# Patient Record
Sex: MALE | Race: WHITE | Employment: STUDENT | URBAN - METROPOLITAN AREA
[De-identification: names, ages, dates, MRNs, and addresses within clinical notes are randomized per-mention and may not be internally consistent; named-entity substitution may affect disease eponyms.]

---

## 2017-12-04 ENCOUNTER — OFFICE VISIT (OUTPATIENT)
Dept: FAMILY MEDICINE | Facility: OTHER | Age: 27
End: 2017-12-04
Payer: COMMERCIAL

## 2017-12-04 VITALS
SYSTOLIC BLOOD PRESSURE: 118 MMHG | HEIGHT: 74 IN | TEMPERATURE: 98.6 F | DIASTOLIC BLOOD PRESSURE: 74 MMHG | HEART RATE: 83 BPM | OXYGEN SATURATION: 97 % | RESPIRATION RATE: 18 BRPM | WEIGHT: 202 LBS | BODY MASS INDEX: 25.93 KG/M2

## 2017-12-04 DIAGNOSIS — J32.9 BACTERIAL SINUSITIS: ICD-10-CM

## 2017-12-04 DIAGNOSIS — B96.89 BACTERIAL SINUSITIS: ICD-10-CM

## 2017-12-04 DIAGNOSIS — R05.9 COUGH: Primary | ICD-10-CM

## 2017-12-04 PROCEDURE — 99213 OFFICE O/P EST LOW 20 MIN: CPT | Performed by: NURSE PRACTITIONER

## 2017-12-04 RX ORDER — AZITHROMYCIN 250 MG/1
TABLET, FILM COATED ORAL
Qty: 6 TABLET | Refills: 0 | Status: SHIPPED | OUTPATIENT
Start: 2017-12-04 | End: 2018-01-31

## 2017-12-04 RX ORDER — AZITHROMYCIN 250 MG/1
TABLET, FILM COATED ORAL
Qty: 6 TABLET | Refills: 0 | Status: SHIPPED | OUTPATIENT
Start: 2017-12-04 | End: 2017-12-04

## 2017-12-04 RX ORDER — CODEINE PHOSPHATE AND GUAIFENESIN 10; 100 MG/5ML; MG/5ML
1 SOLUTION ORAL EVERY 4 HOURS PRN
Qty: 120 ML | Refills: 0 | Status: SHIPPED | OUTPATIENT
Start: 2017-12-04 | End: 2018-01-31

## 2017-12-04 NOTE — PROGRESS NOTES
"  SUBJECTIVE:                                                    Efren Handley is a 27 year old male who presents to clinic today for the following health issues:      HPI    Acute Illness   Acute illness concerns: cough  Onset: 6 days     Fever: no     Chills/Sweats: no     Headache (location?): YES    Sinus Pressure:no    Conjunctivitis:  no    Ear Pain: no    Rhinorrhea: YES    Congestion: no     Sore Throat: YES- but that has gone away      Cough: YES-productive of green sputum (sometimes hard chunks)    Wheeze: no     Decreased Appetite: no     Nausea: no    Vomiting: no    Diarrhea:  no    Dysuria/Freq.: no    Fatigue/Achiness: YES- tired     Sick/Strep Exposure: YES- son just started feeling sick today      Therapies Tried and outcome: dayquil     Problem list and histories reviewed & adjusted, as indicated.  Additional history: as documented    BP Readings from Last 3 Encounters:   12/04/17 118/74   04/24/15 120/70   11/07/06 116/74    Wt Readings from Last 3 Encounters:   12/04/17 202 lb (91.6 kg)   04/24/15 199 lb 6.4 oz (90.4 kg)   11/07/06 162 lb (73.5 kg) (65 %)*     * Growth percentiles are based on CDC 2-20 Years data.         Labs reviewed in EPIC      ROS:  Constitutional, HEENT, cardiovascular, pulmonary, gi and gu systems are negative, except as otherwise noted.      OBJECTIVE:   /74 (BP Location: Left arm, Patient Position: Chair, Cuff Size: Adult Large)  Pulse 83  Temp 98.6  F (37  C) (Oral)  Resp 18  Ht 6' 1.82\" (1.875 m)  Wt 202 lb (91.6 kg)  SpO2 97%  BMI 26.06 kg/m2  Body mass index is 26.06 kg/(m^2).  GENERAL: alert, no distress and fatigued  EYES: Eyes grossly normal to inspection, PERRL and conjunctivae and sclerae normal  HENT: ear canals and TM's normal, nose and mouth without ulcers or lesions, nasal mucosa edematous , rhinorrhea yellow, oropharynx clear, oral mucous membranes moist and tonsillar erythema  NECK: bilateral anterior cervical adenopathy, no asymmetry, masses, " or scars and thyroid normal to palpation  RESP: lungs clear to auscultation - no rales, rhonchi or wheezes  CV: regular rate and rhythm, normal S1 S2, no S3 or S4, no murmur, click or rub, no peripheral edema and peripheral pulses strong  SKIN: no suspicious lesions or rashes  NEURO: Normal strength and tone, mentation intact and speech normal  PSYCH: mentation appears normal, affect normal/bright  LYMPH: no cervical, supraclavicular, axillary, or inguinal adenopathy    Diagnostic Test Results:  none     ASSESSMENT/PLAN:     1. Cough    - guaiFENesin-codeine (ROBITUSSIN AC) 100-10 MG/5ML SOLN solution; Take 5 mLs by mouth every 4 hours as needed for cough  Dispense: 120 mL; Refill: 0    2. Bacterial sinusitis  Will wait for 3 day if symptoms worsen or do not improve will start abx.   - azithromycin (ZITHROMAX) 250 MG tablet; Two tablets first day, then one tablet daily for four days.  Dispense: 6 tablet; Refill: 0    Patient Instructions   Please start using nasal saline spray for sinuses to help thin and remove thick mucous. If able to increase humidity. May start antibiotic if symptoms continue past 3 days (72 hours).      Please take antibiotic with a probiotic or yogurt (3 small containers) daily not directly with the antibiotic for optimal good bacterial protection.     Return to clinic is symptoms do not improve or worsen.       Thank you  Sunshine Phillips, TALI [facility]

## 2017-12-04 NOTE — MR AVS SNAPSHOT
"              After Visit Summary   12/4/2017    Efren Handley    MRN: [id number]           Patient Information     Date Of Birth          1990        Visit Information        Provider Department      12/4/2017 8:50 AM Sunshine Phillips APRN CNP [facility]        Today's Diagnoses     Cough    -  1    Bacterial sinusitis          Care Instructions    Please start using nasal saline spray for sinuses to help thin and remove thick mucous. If able to increase humidity. May start antibiotic if symptoms continue past 3 days (72 hours).      Please take antibiotic with a probiotic or yogurt (3 small containers) daily not directly with the antibiotic for optimal good bacterial protection.     Return to clinic is symptoms do not improve or worsen.       Thank you  Sunshine Phillips CNP            Follow-ups after your visit        Who to contact     If you have questions or need follow up information about today's clinic visit or your schedule please contact [facility] directly at [phone].  Normal or non-critical lab and imaging results will be communicated to you by [employer], letter or phone within 4 business days after the clinic has received the results. If you do not hear from us within 7 days, please contact the clinic through [employer] or phone. If you have a critical or abnormal lab result, we will notify you by phone as soon as possible.  Submit refill requests through [employer] or call your pharmacy and they will forward the refill request to us. Please allow 3 business days for your refill to be completed.          Additional Information About Your Visit        [employer] Information     [employer] lets you send messages to your doctor, view your test results, renew your prescriptions, schedule appointments and more. To sign up, go to [email or web address] . Click on \"Log in\" on the left side of the screen, which will take you to the Welcome page. Then click on \"Sign up Now\" " "on the right side of the page.     You will be asked to enter the access code listed below, as well as some personal information. Please follow the directions to create your username and password.     Your access code is: [email or web address]  Expires: 3/4/2018  9:34 AM     Your access code will  in 90 days. If you need help or a new code, please call your [place] clinic or [phone].        Care EveryWhere ID     This is your Care EveryWhere ID. This could be used by other organizations to access your [place] medical records  [id number]        Your Vitals Were     Pulse Temperature Respirations Height Pulse Oximetry BMI (Body Mass Index)    83 98.6  F (37  C) (Oral) 18 6' 1.82\" (1.875 m) 97% 26.06 kg/m2       Blood Pressure from Last 3 Encounters:   17 118/74   04/24/15 120/70   06 116/74    Weight from Last 3 Encounters:   17 202 lb (91.6 kg)   04/24/15 199 lb 6.4 oz (90.4 kg)   06 162 lb (73.5 kg) (65 %)*     * Growth percentiles are based on CDC 2-20 Years data.              Today, you had the following     No orders found for display         Today's Medication Changes          These changes are accurate as of: 17  9:34 AM.  If you have any questions, ask your nurse or doctor.               Start taking these medicines.        Dose/Directions    azithromycin 250 MG tablet   Commonly known as:  ZITHROMAX   Used for:  Bacterial sinusitis   Started by:  Sunshine Phillips APRN CNP        Two tablets first day, then one tablet daily for four days.   Quantity:  6 tablet   Refills:  0       guaiFENesin-codeine 100-10 MG/5ML Soln solution   Commonly known as:  ROBITUSSIN AC   Used for:  Cough   Started by:  Sunshine Phillips APRN CNP        Dose:  1 tsp.   Take 5 mLs by mouth every 4 hours as needed for cough   Quantity:  120 mL   Refills:  0            Where to get your medicines      Some of these will need a paper prescription and others can be bought over the " counter.  Ask your nurse if you have questions.     Bring a paper prescription for each of these medications     azithromycin 250 MG tablet    guaiFENesin-codeine 100-10 MG/5ML Soln solution                Primary Care Provider Office Phone # Fax #    Jesus Barksdale PA-C [phone]       [address] [address]        Equal Access to Services     [facility] Francisco[facility]VIDA : Hadii aad ku hadasho Soomaali, waaxda luqadaha, qaybta kaalmada adeegyada, waxay idiin hayaan adeeg kharash la'aan ah. So [facility] [phone].    ATENCIÓN: Si habla espkaron, tiene a white disposición servicios gratuitos de asistencia lingüística. Ivelisse al [phone].    We comply with applicable federal civil rights laws and [place] laws. We do not discriminate on the basis of race, color, national origin, age, disability, sex, sexual orientation, or gender identity.            Thank you!     Thank you for choosing [facility]  for your care. Our goal is always to provide you with excellent care. Hearing back from our patients is one way we can continue to improve our services. Please take a few minutes to complete the written survey that you may receive in the mail after your visit with us. Thank you!             Your Updated Medication List - Protect others around you: Learn how to safely use, store and throw away your medicines at www.disposemymeds.org.          This list is accurate as of: 12/4/17  9:34 AM.  Always use your most recent med list.                   Brand Name Dispense Instructions for use Diagnosis    azithromycin 250 MG tablet    ZITHROMAX    6 tablet    Two tablets first day, then one tablet daily for four days.    Bacterial sinusitis       guaiFENesin-codeine 100-10 MG/5ML Soln solution    ROBITUSSIN AC    120 mL    Take 5 mLs by mouth every 4 hours as needed for cough    Cough       NO ACTIVE MEDICATIONS     0    .

## 2017-12-04 NOTE — PATIENT INSTRUCTIONS
Please start using nasal saline spray for sinuses to help thin and remove thick mucous. If able to increase humidity. May start antibiotic if symptoms continue past 3 days (72 hours).      Please take antibiotic with a probiotic or yogurt (3 small containers) daily not directly with the antibiotic for optimal good bacterial protection.     Return to clinic is symptoms do not improve or worsen.       Thank you  Sunshine Phillips CNP

## 2017-12-04 NOTE — NURSING NOTE
"Chief Complaint   Patient presents with     Sick       Initial /74 (BP Location: Left arm, Patient Position: Chair, Cuff Size: Adult Large)  Pulse 83  Temp 98.6  F (37  C) (Oral)  Resp 18  Ht 6' 1.82\" (1.875 m)  Wt 202 lb (91.6 kg)  SpO2 97%  BMI 26.06 kg/m2 Estimated body mass index is 26.06 kg/(m^2) as calculated from the following:    Height as of this encounter: 6' 1.82\" (1.875 m).    Weight as of this encounter: 202 lb (91.6 kg).  Medication Reconciliation: complete    "

## 2018-01-31 ENCOUNTER — OFFICE VISIT (OUTPATIENT)
Dept: UROLOGY | Facility: OTHER | Age: 28
End: 2018-01-31
Payer: COMMERCIAL

## 2018-01-31 VITALS
WEIGHT: 211.5 LBS | DIASTOLIC BLOOD PRESSURE: 60 MMHG | HEIGHT: 74 IN | SYSTOLIC BLOOD PRESSURE: 120 MMHG | BODY MASS INDEX: 27.14 KG/M2

## 2018-01-31 DIAGNOSIS — Z30.09 VASECTOMY EVALUATION: Primary | ICD-10-CM

## 2018-01-31 PROCEDURE — 99202 OFFICE O/P NEW SF 15 MIN: CPT | Performed by: UROLOGY

## 2018-01-31 NOTE — NURSING NOTE
"Chief Complaint   Patient presents with     Consult     vasectomy        Initial /60 (BP Location: Right arm, Patient Position: Chair, Cuff Size: Adult Regular)  Ht 6' 1.82\" (1.875 m)  Wt 211 lb 8 oz (95.9 kg)  BMI 27.29 kg/m2 Estimated body mass index is 27.29 kg/(m^2) as calculated from the following:    Height as of this encounter: 6' 1.82\" (1.875 m).    Weight as of this encounter: 211 lb 8 oz (95.9 kg).  Medication Reconciliation: complete     Nolvia Hoffman, PRANAY       "

## 2018-01-31 NOTE — MR AVS SNAPSHOT
After Visit Summary   1/31/2018    Efren Handley    MRN: [id number]           Patient Information     Date Of Birth          1990        Visit Information        Provider Department      1/31/2018 9:15 AM Lloyd Quigley MD [facility] Instructions    Your Vasectomy is scheduled on April 5th, 2018 at 10:45am.  Please call [phone] if you need to reschedule this appointment or if you have any question.     Preparation for Vasectomy:  *Shave the hair away from the base of your penis and around your testicles.  *Wear snug underwear the day of the vasectomy to support your testicles.  *Do not take aspirin, ibuprofen, advil, motrin, aleve products one week prior to your vasectomy.  *Arrange for a  if you take any medication for relaxation from the physician.      General Vasectomy Information    Vasectomy is a surgery.  If it is successful, it will be impossible for you to ever father children.  The following information regards the male sterilization done by an operation called a vasectomy.  This is done in the physician's office.    The operation done to sterilize the male is easier, safer and much less expensive than the operation done to sterilize the woman.    Sterilization should be considered permanent.  For most males, once the operation is done, it can never me undone.  There are attempts made occasionally to reconnect the tubes that have been cut during the procedure, but this is very difficult and expensive and works only about 50-70% of the time.  In order for any of the physicians in our clinic to do a vasectomy, we require that you consider this a permanent form a sterilization.    A vasectomy can be done at any time, but it is best to think about having it done when you can take at least one day off from work and any excess activities.    Your decision to have a vasectomy should only be made with the following facts clear in mind.    1. First, a  vasectomy is only one of several means of birth control.  Many form of temporary contraception are available.  If you have any questions about other methods, please discuss this with your physician.    2. A vasectomy may be unsuccessful in approximately one out of 1000 couples per year.  This occurs when the tubes which are cut during the procedure reconnect themselves.  Sterility cannot be guaranteed.    3. You should be aware that it is the current belief of the medical profession that a vasectomy procedure does not alter a male physically, physiologically or sexually.  Because each person is a unique individual, there is always the possibility of an adverse phychiatric reaction.  This can be best avoided by being very comfortable in your own mind that you want to have this done, and that you do not want to father any children in the future.  If this is not clear in your mind, this should be further discussed with your physician.    4. You will not notice a change in the volume of your ejaculate since sperm is a very small amount of the semen and it is only the sperm that is stopped from entering the ejaculate after a vasectomy.  Your prostate and seminal vesicle glands really supply most of the semen and this is not at all decreased after a vasectomy.  Also there is no effect on the male hormones.    5. You do not become sterile immediately following a vasectomy due to the fact that there is still sperm remaining in your system that must be eliminated by ejaculation.  For this reason, your sexual partner could still become pregnant for a period of time following the vasectomy operation.  It is necessary that contraceptive measures be used until you receive confirmation from your physician that you are sterile.  It takes approximately 12 ejaculations to clear the semen of sperm, but this can differ in different men.  For this reason, it is very important that your semen be checked for sperm before you are  "considered sterile, and this should be done approximately 12 weeks after your vasectomy.      6. Vasectomy has risks and benefits.  Among the risks are possible complications resulting from the procedure.  These risks include but are not limited to:   A.  Bleeding, infection, or hematoma occuring during or in the recovery period   from the procedure.   B.  Sperm granuloma or a small pea to walnut sized lump which is a collection of   scar tissue and sperm in your scrotal sack and remains permanently   C.  There may be an increased risk of prostate cancer although the data is   unclear.            Follow-ups after your visit        Your next 10 appointments already scheduled     Apr 05, 2018 10:45 AM CDT   Return Visit with Lloyd Quigley MD   [facility]    [address] [address] [phone]              Who to contact     If you have questions or need follow up information about today's clinic visit or your schedule please contact [facility] directly at [phone].  Normal or non-critical lab and imaging results will be communicated to you by [employer], letter or phone within 4 business days after the clinic has received the results. If you do not hear from us within 7 days, please contact the clinic through [employer] or phone. If you have a critical or abnormal lab result, we will notify you by phone as soon as possible.  Submit refill requests through [employer] or call your pharmacy and they will forward the refill request to us. Please allow 3 business days for your refill to be completed.          Additional Information About Your Visit        [employer] Information     [employer] lets you send messages to your doctor, view your test results, renew your prescriptions, schedule appointments and more. To sign up, go to [email or web address] . Click on \"Log in\" on the left side of the screen, which will take you to the Welcome page. Then " "click on \"Sign up Now\" on the right side of the page.     You will be asked to enter the access code listed below, as well as some personal information. Please follow the directions to create your username and password.     Your access code is: [email or web address]  Expires: 3/4/2018  9:34 AM     Your access code will  in 90 days. If you need help or a new code, please call your [place] clinic or [phone].        Care EveryWhere ID     This is your Care EveryWhere ID. This could be used by other organizations to access your [place] medical records  [id number]        Your Vitals Were     Height BMI (Body Mass Index)                6' 1.82\" (1.875 m) 27.29 kg/m2           Blood Pressure from Last 3 Encounters:   18 120/60   17 118/74   04/24/15 120/70    Weight from Last 3 Encounters:   18 211 lb 8 oz (95.9 kg)   17 202 lb (91.6 kg)   04/24/15 199 lb 6.4 oz (90.4 kg)              Today, you had the following     No orders found for display       Primary Care Provider Office Phone # Fax #    [facility] [phone]       [address] [place] [address]        Equal Access to Services     TAMY ANDRADE AH: Hadii merline ku hadasho Soomaali, waaxda luqadaha, qaybta kaalmada adeegyada, megan mcgregor . So [facility] [phone].    ATENCIÓN: Si habla español, tiene a white disposición servicios gratuitos de asistencia lingüística. Llame al [phone].    We comply with applicable federal civil rights laws and [place] laws. We do not discriminate on the basis of race, color, national origin, age, disability, sex, sexual orientation, or gender identity.            Thank you!     Thank you for choosing [facility]  for your care. Our goal is always to provide you with excellent care. Hearing back from our patients is one way we can continue to improve our services. Please take a few minutes to complete the written survey that you " may receive in the mail after your visit with us. Thank you!             Your Updated Medication List - Protect others around you: Learn how to safely use, store and throw away your medicines at www.disposemymeds.org.          This list is accurate as of 1/31/18  9:16 AM.  Always use your most recent med list.                   Brand Name Dispense Instructions for use Diagnosis    NO ACTIVE MEDICATIONS     0    .

## 2018-01-31 NOTE — PATIENT INSTRUCTIONS
Your Vasectomy is scheduled on April 5th, 2018 at 10:45am.  Please call [phone] if you need to reschedule this appointment or if you have any question.     Preparation for Vasectomy:  *Shave the hair away from the base of your penis and around your testicles.  *Wear snug underwear the day of the vasectomy to support your testicles.  *Do not take aspirin, ibuprofen, advil, motrin, aleve products one week prior to your vasectomy.  *Arrange for a  if you take any medication for relaxation from the physician.      General Vasectomy Information    Vasectomy is a surgery.  If it is successful, it will be impossible for you to ever father children.  The following information regards the male sterilization done by an operation called a vasectomy.  This is done in the physician's office.    The operation done to sterilize the male is easier, safer and much less expensive than the operation done to sterilize the woman.    Sterilization should be considered permanent.  For most males, once the operation is done, it can never me undone.  There are attempts made occasionally to reconnect the tubes that have been cut during the procedure, but this is very difficult and expensive and works only about 50-70% of the time.  In order for any of the physicians in our clinic to do a vasectomy, we require that you consider this a permanent form a sterilization.    A vasectomy can be done at any time, but it is best to think about having it done when you can take at least one day off from work and any excess activities.    Your decision to have a vasectomy should only be made with the following facts clear in mind.    1. First, a vasectomy is only one of several means of birth control.  Many form of temporary contraception are available.  If you have any questions about other methods, please discuss this with your physician.    2. A vasectomy may be unsuccessful in approximately one out of 1000 couples per year.  This occurs  when the tubes which are cut during the procedure reconnect themselves.  Sterility cannot be guaranteed.    3. You should be aware that it is the current belief of the medical profession that a vasectomy procedure does not alter a male physically, physiologically or sexually.  Because each person is a unique individual, there is always the possibility of an adverse phychiatric reaction.  This can be best avoided by being very comfortable in your own mind that you want to have this done, and that you do not want to father any children in the future.  If this is not clear in your mind, this should be further discussed with your physician.    4. You will not notice a change in the volume of your ejaculate since sperm is a very small amount of the semen and it is only the sperm that is stopped from entering the ejaculate after a vasectomy.  Your prostate and seminal vesicle glands really supply most of the semen and this is not at all decreased after a vasectomy.  Also there is no effect on the male hormones.    5. You do not become sterile immediately following a vasectomy due to the fact that there is still sperm remaining in your system that must be eliminated by ejaculation.  For this reason, your sexual partner could still become pregnant for a period of time following the vasectomy operation.  It is necessary that contraceptive measures be used until you receive confirmation from your physician that you are sterile.  It takes approximately 12 ejaculations to clear the semen of sperm, but this can differ in different men.  For this reason, it is very important that your semen be checked for sperm before you are considered sterile, and this should be done approximately 12 weeks after your vasectomy.      6. Vasectomy has risks and benefits.  Among the risks are possible complications resulting from the procedure.  These risks include but are not limited to:   A.  Bleeding, infection, or hematoma occuring during or  in the recovery period   from the procedure.   B.  Sperm granuloma or a small pea to walnut sized lump which is a collection of   scar tissue and sperm in your scrotal sack and remains permanently   C.  There may be an increased risk of prostate cancer although the data is   unclear.

## 2018-02-12 NOTE — PROGRESS NOTES
"  SUBJECTIVE:   Efren Handley is a 28 year old male who presents to clinic today for the following health issues:      HPI     Concern - Collar bone pain   Onset: 1 month    Description:   Bench pressing at the gym and felt a pop. Pain ever since. Getting a bit better.ROM decreased     Intensity: moderate    Progression of Symptoms:  Improving slightly     Precipitating factors:   Worsened by: lifting arm    Therapies Tried and outcome: arm    Problem list and histories reviewed & adjusted, as indicated.  Additional history: as documented    Patient Active Problem List   Diagnosis     CARDIOVASCULAR SCREENING; LDL GOAL LESS THAN 160     Past Surgical History:   Procedure Laterality Date     NO HISTORY OF SURGERY         Social History   Substance Use Topics     Smoking status: Former Smoker     Smokeless tobacco: Never Used     Alcohol use Yes      Comment: occ     History reviewed. No pertinent family history.      Current Outpatient Prescriptions   Medication Sig Dispense Refill     NO ACTIVE MEDICATIONS . 0 0     No lab results found.   BP Readings from Last 3 Encounters:   02/14/18 118/70   01/31/18 120/60   12/04/17 118/74    Wt Readings from Last 3 Encounters:   02/14/18 209 lb (94.8 kg)   01/31/18 211 lb 8 oz (95.9 kg)   12/04/17 202 lb (91.6 kg)            Labs reviewed in EPIC    ROS:  Constitutional, HEENT, cardiovascular, pulmonary, gi and gu systems are negative, except as otherwise noted.    OBJECTIVE:     /70 (BP Location: Right arm, Patient Position: Chair, Cuff Size: Adult Regular)  Pulse 68  Temp 98.1  F (36.7  C) (Oral)  Resp 16  Ht 6' 1.82\" (1.875 m)  Wt 209 lb (94.8 kg)  BMI 26.97 kg/m2  Body mass index is 26.97 kg/(m^2).  GENERAL: healthy, alert and no distress  NECK: no adenopathy, no asymmetry, masses, or scars and thyroid normal to palpation  RESP: lungs clear to auscultation - no rales, rhonchi or wheezes  CV: regular rate and rhythm, normal S1 S2, no S3 or S4, no murmur, click " or rub, no peripheral edema and peripheral pulses strong  MS:   SHOULDER Exam-Right   Inspection: swelling- no , redness- no , bruising- no , asymmetry- YES, glenohumeral joint anterior bulge- no , distal clavicle elevation- YES, muscle atrophy- none and scapular winging- no       Range of Motion: Active-pain with abduction of arm across body and behind body and with reaching up.     Strength: Good strength 5/5   Special tests: Positive painful arc- negative        SKIN: no suspicious lesions or rashes        ASSESSMENT/PLAN:     1. Pain of right clavicle  Questionable AC joint x-ray obtained and negative for normality.  Will treat for strain handout given and reviewed with patient    2. Need for prophylactic vaccination and inoculation against influenza  Declined    Home care instructions were reviewed with the patient. The risks, benefits and treatment options of prescribed medications or other treatments have been discussed with the patient. The patient verbalized their understanding and should call or follow up if no improvement or if they develop further problems.  Return to clinic prn    Patient Instructions         AC Joint Sprain (Adult)    The AC or acromioclavicular joint is at the end of the collar bone, or clavicle, near the shoulder. The AC joint is made of 4 ligaments that hold the collar bone to the shoulder blade, or scapula. With an AC joint sprain, these ligaments may be partly or fully torn. In both cases this causes pain and swelling at the end of the collar bone. If the ligaments are completely torn, the collar bone will rise up.  AC joint sprains are given a grade depending on whether they are mild, moderate, or severe:    Grade 1. A mild sprain, with minor damage to the ligaments. The collar bone stays in place.    Grade 2. A moderate sprain. The ligaments are partly torn. The collar bone is moved out of place. The injured shoulder may look lower and flatter than the other shoulder.    Grade  3. The most severe kind of sprain. The ligaments are completely torn. The collar bone is no longer joined to the shoulder blade. The collar bone rises up. This creates a bump on top of the shoulder. The ligaments heal in this position, so the bump does not go away. It is possible to have surgery to correct the bump. But normal shoulder function will return even without surgery.  An AC sprain will take up to 6 weeks to heal, depending on how severe it is. It is often treated with a sling. Or a sling and an elastic wrap around the chest may be used. Physical therapy may be needed to help the shoulder keep full range of motion. Once healed, you can expect full recovery of shoulder function.  Home care    Your provider may prescribe medicines for pain. Or you may use over-the-counter pain medicines. Talk with your provider beforetaking medicines if you have chronic liver or kidney disease, or have ever had a stomach ulcer or GI (gastrointestinal) bleeding.    Make an appointment right away to see your provider or an orthopedic or bone doctor, for further evaluation and treatment of the injury.    Use the injured area as little as possible. This will help decrease pain and swelling and allow the area to heal.    Place an ice pack over the injured area for 15 minutes. Do this every 4 to 6 hours for the first 24 to 48 hours, or as directed. Keep using ice packs to ease pain and swelling as directed by your provider or the orthopedic doctor.    To make an ice pack, put ice cubes in a plastic bag that seals at the top. Wrap the bag in a clean, thin towel or cloth. Never put ice or an ice pack directly on the skin. The ice pack can be put right on the wrap or sling. As the ice melts, be careful to not get the wrap or sling wet.    A sling alone is often enough. Sometime a sling and a wrap around the chest may be used to help ease pain, if needed. This also helps keep your injured arm from moving. Use these devices as advised  until you are seen by your healthcare provider or the orthopedic doctor. Always ask when the wrap should be worn. Always ask when the wrap can be removed.    Wear the sling while awake or as advised, until you are scheduled to see your healthcare provider or an orthopedic doctor. You may remove the sling to sleep. You may remove the sling to bathe. Make sure the sling is comfortable and keeps your arm raised, as advised by the provider. Follow the provider s instructions on how to use the sling. Always ask when you should wear the sling. Always ask when the sling can be removed.    Shoulder joints become stiff if they are kept still for too long. Talk with your healthcare provider or the orthopedic doctor about range of motion exercises and possible physical therapy.  Follow-up care  Follow up with your healthcare provider, or as advised. Your provider may refer you to a specialist such as an orthopedic, or bone, doctor.  If X-rays were taken, you will be told of any new findings that may affect your care.  When to seek medical advice  Call your healthcare provider right away if any of these occur:    Your shoulder looks off-balance    You have increased pain, swelling, or bruising    You have increased pain even after using prescribed pain medicine    You have continuing pain     Your hand or arm becomes cold, blue, numb, or tingly    You have trouble moving your shoulder, wrist, or elbow due to stiffness  Date Last Reviewed: 10/8/2015    5088-0168 [employer]. [address] [place] [address]. All rights reserved. This information is not intended as a substitute for professional medical care. Always follow your healthcare professional's instructions.        Please rest and ice shoulder. Do gentle range of motion (pendulum) exercises.  Avoid moving arm laterally across the body extending excessively overhead or placing the arm behind back.  I will call you with your xray results and any  additional treatment as indicated.     Thank you  Sunshine Phillips [facility]

## 2018-02-14 ENCOUNTER — OFFICE VISIT (OUTPATIENT)
Dept: FAMILY MEDICINE | Facility: OTHER | Age: 28
End: 2018-02-14
Payer: COMMERCIAL

## 2018-02-14 ENCOUNTER — RADIANT APPOINTMENT (OUTPATIENT)
Dept: GENERAL RADIOLOGY | Facility: OTHER | Age: 28
End: 2018-02-14
Attending: NURSE PRACTITIONER
Payer: COMMERCIAL

## 2018-02-14 VITALS
BODY MASS INDEX: 26.82 KG/M2 | RESPIRATION RATE: 16 BRPM | TEMPERATURE: 98.1 F | WEIGHT: 209 LBS | HEART RATE: 68 BPM | DIASTOLIC BLOOD PRESSURE: 70 MMHG | HEIGHT: 74 IN | SYSTOLIC BLOOD PRESSURE: 118 MMHG

## 2018-02-14 DIAGNOSIS — M89.8X1 PAIN OF RIGHT CLAVICLE: Primary | ICD-10-CM

## 2018-02-14 DIAGNOSIS — M89.8X1 PAIN OF RIGHT CLAVICLE: ICD-10-CM

## 2018-02-14 DIAGNOSIS — Z23 NEED FOR PROPHYLACTIC VACCINATION AND INOCULATION AGAINST INFLUENZA: ICD-10-CM

## 2018-02-14 PROCEDURE — 99213 OFFICE O/P EST LOW 20 MIN: CPT | Performed by: NURSE PRACTITIONER

## 2018-02-14 PROCEDURE — 73030 X-RAY EXAM OF SHOULDER: CPT | Mod: RT

## 2018-02-14 NOTE — NURSING NOTE
"Chief Complaint   Patient presents with     Pain     collar bone pain     Panel Management     donald, jose roberto, flu       Initial /70 (BP Location: Right arm, Patient Position: Chair, Cuff Size: Adult Regular)  Pulse 68  Temp 98.1  F (36.7  C) (Oral)  Resp 16  Ht 6' 1.82\" (1.875 m)  Wt 209 lb (94.8 kg)  BMI 26.97 kg/m2 Estimated body mass index is 26.97 kg/(m^2) as calculated from the following:    Height as of this encounter: 6' 1.82\" (1.875 m).    Weight as of this encounter: 209 lb (94.8 kg).  Medication Reconciliation: complete    "

## 2018-02-14 NOTE — MR AVS SNAPSHOT
After Visit Summary   2/14/2018    Efren Handley    MRN: [id number]           Patient Information     Date Of Birth          1990        Visit Information        Provider Department      2/14/2018 2:40 PM Sunshine Phillips APRN [facility]        Today's Diagnoses     Need for prophylactic vaccination and inoculation against influenza    -  1    Pain of right clavicle          Care Instructions          AC Joint Sprain (Adult)    The AC or acromioclavicular joint is at the end of the collar bone, or clavicle, near the shoulder. The AC joint is made of 4 ligaments that hold the collar bone to the shoulder blade, or scapula. With an AC joint sprain, these ligaments may be partly or fully torn. In both cases this causes pain and swelling at the end of the collar bone. If the ligaments are completely torn, the collar bone will rise up.  AC joint sprains are given a grade depending on whether they are mild, moderate, or severe:    Grade 1. A mild sprain, with minor damage to the ligaments. The collar bone stays in place.    Grade 2. A moderate sprain. The ligaments are partly torn. The collar bone is moved out of place. The injured shoulder may look lower and flatter than the other shoulder.    Grade 3. The most severe kind of sprain. The ligaments are completely torn. The collar bone is no longer joined to the shoulder blade. The collar bone rises up. This creates a bump on top of the shoulder. The ligaments heal in this position, so the bump does not go away. It is possible to have surgery to correct the bump. But normal shoulder function will return even without surgery.  An AC sprain will take up to 6 weeks to heal, depending on how severe it is. It is often treated with a sling. Or a sling and an elastic wrap around the chest may be used. Physical therapy may be needed to help the shoulder keep full range of motion. Once healed, you can expect full recovery of shoulder  function.  Home care    Your provider may prescribe medicines for pain. Or you may use over-the-counter pain medicines. Talk with your provider beforetaking medicines if you have chronic liver or kidney disease, or have ever had a stomach ulcer or GI (gastrointestinal) bleeding.    Make an appointment right away to see your provider or an orthopedic or bone doctor, for further evaluation and treatment of the injury.    Use the injured area as little as possible. This will help decrease pain and swelling and allow the area to heal.    Place an ice pack over the injured area for 15 minutes. Do this every 4 to 6 hours for the first 24 to 48 hours, or as directed. Keep using ice packs to ease pain and swelling as directed by your provider or the orthopedic doctor.    To make an ice pack, put ice cubes in a plastic bag that seals at the top. Wrap the bag in a clean, thin towel or cloth. Never put ice or an ice pack directly on the skin. The ice pack can be put right on the wrap or sling. As the ice melts, be careful to not get the wrap or sling wet.    A sling alone is often enough. Sometime a sling and a wrap around the chest may be used to help ease pain, if needed. This also helps keep your injured arm from moving. Use these devices as advised until you are seen by your healthcare provider or the orthopedic doctor. Always ask when the wrap should be worn. Always ask when the wrap can be removed.    Wear the sling while awake or as advised, until you are scheduled to see your healthcare provider or an orthopedic doctor. You may remove the sling to sleep. You may remove the sling to bathe. Make sure the sling is comfortable and keeps your arm raised, as advised by the provider. Follow the provider s instructions on how to use the sling. Always ask when you should wear the sling. Always ask when the sling can be removed.    Shoulder joints become stiff if they are kept still for too long. Talk with your healthcare  provider or the orthopedic doctor about range of motion exercises and possible physical therapy.  Follow-up care  Follow up with your healthcare provider, or as advised. Your provider may refer you to a specialist such as an orthopedic, or bone, doctor.  If X-rays were taken, you will be told of any new findings that may affect your care.  When to seek medical advice  Call your healthcare provider right away if any of these occur:    Your shoulder looks off-balance    You have increased pain, swelling, or bruising    You have increased pain even after using prescribed pain medicine    You have continuing pain     Your hand or arm becomes cold, blue, numb, or tingly    You have trouble moving your shoulder, wrist, or elbow due to stiffness  Date Last Reviewed: 10/8/2015    3312-3485 [employer]. [address] [place] [address]. All rights reserved. This information is not intended as a substitute for professional medical care. Always follow your healthcare professional's instructions.        Please rest and ice shoulder. Do gentle range of motion (pendulum) exercises.  Avoid moving arm laterally across the body extending excessively overhead or placing the arm behind back.  I will call you with your xray results and any additional treatment as indicated.     Thank you  Sunshine Phillips CNP                  Follow-ups after your visit        Your next 10 appointments already scheduled     Apr 05, 2018 10:45 AM CDT   Return Visit with Lloyd Quigley MD   [facility]    [address] [facility] [address] [phone]              Who to contact     If you have questions or need follow up information about today's clinic visit or your schedule please contact [facility] directly at [phone].  Normal or non-critical lab and imaging results will be communicated to you by MyChart, letter or phone within 4 business days after the  "clinic has received the results. If you do not hear from us within 7 days, please contact the clinic through [employer] or phone. If you have a critical or abnormal lab result, we will notify you by phone as soon as possible.  Submit refill requests through [employer] or call your pharmacy and they will forward the refill request to us. Please allow 3 business days for your refill to be completed.          Additional Information About Your Visit        [employer] Information     [employer] lets you send messages to your doctor, view your test results, renew your prescriptions, schedule appointments and more. To sign up, go to [email or web address] . Click on \"Log in\" on the left side of the screen, which will take you to the Welcome page. Then click on \"Sign up Now\" on the right side of the page.     You will be asked to enter the access code listed below, as well as some personal information. Please follow the directions to create your username and password.     Your access code is: [email or web address]  Expires: 3/4/2018  9:34 AM     Your access code will  in 90 days. If you need help or a new code, please call your [facility] clinic or [phone].        Care EveryWhere ID     This is your Care EveryWhere ID. This could be used by other organizations to access your [facility] medical records  [id number]        Your Vitals Were     Pulse Temperature Respirations Height BMI (Body Mass Index)       68 98.1  F (36.7  C) (Oral) 16 6' 1.82\" (1.875 m) 26.97 kg/m2        Blood Pressure from Last 3 Encounters:   18 118/70   18 120/60   17 118/74    Weight from Last 3 Encounters:   18 209 lb (94.8 kg)   18 211 lb 8 oz (95.9 kg)   17 202 lb (91.6 kg)              Today, you had the following     No orders found for display       Primary Care Provider Office Phone # Fax #    [facility] [phone]       [address] [place] [address]        Equal Access to Services "     TAMY ANDRADE : Hadii aad marcela javan Ogden, waaxda luqadaha, qaybta kaalmada kobecarrietomi, megan buckneralexyeison mcgregor . So [facility] [phone].    ATENCIÓN: Si habla español, tiene a white disposición servicios gratuitos de asistencia lingüística. Llame al [phone].    We comply with applicable federal civil rights laws and [place] laws. We do not discriminate on the basis of race, color, national origin, age, disability, sex, sexual orientation, or gender identity.            Thank you!     Thank you for choosing [facility]  for your care. Our goal is always to provide you with excellent care. Hearing back from our patients is one way we can continue to improve our services. Please take a few minutes to complete the written survey that you may receive in the mail after your visit with us. Thank you!             Your Updated Medication List - Protect others around you: Learn how to safely use, store and throw away your medicines at www.disposemymeds.org.          This list is accurate as of 2/14/18  3:09 PM.  Always use your most recent med list.                   Brand Name Dispense Instructions for use Diagnosis    NO ACTIVE MEDICATIONS     0    .

## 2018-02-14 NOTE — PROGRESS NOTES
Please advise Efren Handley,  1990, that his x-ray result were negative for AC joint separation or tear.  There was also no fracture noticed.  Recommend home care as discussed please return to clinic if symptoms do not improve.        [phone] (home) [phone] (work)  Sunshine Phillips

## 2018-02-14 NOTE — PATIENT INSTRUCTIONS
AC Joint Sprain (Adult)    The AC or acromioclavicular joint is at the end of the collar bone, or clavicle, near the shoulder. The AC joint is made of 4 ligaments that hold the collar bone to the shoulder blade, or scapula. With an AC joint sprain, these ligaments may be partly or fully torn. In both cases this causes pain and swelling at the end of the collar bone. If the ligaments are completely torn, the collar bone will rise up.  AC joint sprains are given a grade depending on whether they are mild, moderate, or severe:    Grade 1. A mild sprain, with minor damage to the ligaments. The collar bone stays in place.    Grade 2. A moderate sprain. The ligaments are partly torn. The collar bone is moved out of place. The injured shoulder may look lower and flatter than the other shoulder.    Grade 3. The most severe kind of sprain. The ligaments are completely torn. The collar bone is no longer joined to the shoulder blade. The collar bone rises up. This creates a bump on top of the shoulder. The ligaments heal in this position, so the bump does not go away. It is possible to have surgery to correct the bump. But normal shoulder function will return even without surgery.  An AC sprain will take up to 6 weeks to heal, depending on how severe it is. It is often treated with a sling. Or a sling and an elastic wrap around the chest may be used. Physical therapy may be needed to help the shoulder keep full range of motion. Once healed, you can expect full recovery of shoulder function.  Home care    Your provider may prescribe medicines for pain. Or you may use over-the-counter pain medicines. Talk with your provider beforetaking medicines if you have chronic liver or kidney disease, or have ever had a stomach ulcer or GI (gastrointestinal) bleeding.    Make an appointment right away to see your provider or an orthopedic or bone doctor, for further evaluation and treatment of the injury.    Use the injured area as  little as possible. This will help decrease pain and swelling and allow the area to heal.    Place an ice pack over the injured area for 15 minutes. Do this every 4 to 6 hours for the first 24 to 48 hours, or as directed. Keep using ice packs to ease pain and swelling as directed by your provider or the orthopedic doctor.    To make an ice pack, put ice cubes in a plastic bag that seals at the top. Wrap the bag in a clean, thin towel or cloth. Never put ice or an ice pack directly on the skin. The ice pack can be put right on the wrap or sling. As the ice melts, be careful to not get the wrap or sling wet.    A sling alone is often enough. Sometime a sling and a wrap around the chest may be used to help ease pain, if needed. This also helps keep your injured arm from moving. Use these devices as advised until you are seen by your healthcare provider or the orthopedic doctor. Always ask when the wrap should be worn. Always ask when the wrap can be removed.    Wear the sling while awake or as advised, until you are scheduled to see your healthcare provider or an orthopedic doctor. You may remove the sling to sleep. You may remove the sling to bathe. Make sure the sling is comfortable and keeps your arm raised, as advised by the provider. Follow the provider s instructions on how to use the sling. Always ask when you should wear the sling. Always ask when the sling can be removed.    Shoulder joints become stiff if they are kept still for too long. Talk with your healthcare provider or the orthopedic doctor about range of motion exercises and possible physical therapy.  Follow-up care  Follow up with your healthcare provider, or as advised. Your provider may refer you to a specialist such as an orthopedic, or bone, doctor.  If X-rays were taken, you will be told of any new findings that may affect your care.  When to seek medical advice  Call your healthcare provider right away if any of these occur:    Your shoulder  looks off-balance    You have increased pain, swelling, or bruising    You have increased pain even after using prescribed pain medicine    You have continuing pain     Your hand or arm becomes cold, blue, numb, or tingly    You have trouble moving your shoulder, wrist, or elbow due to stiffness  Date Last Reviewed: 10/8/2015    0844-4124 [employer]. [address] [place] [address]. All rights reserved. This information is not intended as a substitute for professional medical care. Always follow your healthcare professional's instructions.        Please rest and ice shoulder. Do gentle range of motion (pendulum) exercises.  Avoid moving arm laterally across the body extending excessively overhead or placing the arm behind back.  I will call you with your xray results and any additional treatment as indicated.     Thank you  Sunshine Phillips CNP

## 2018-06-20 ENCOUNTER — OFFICE VISIT (OUTPATIENT)
Dept: UROLOGY | Facility: OTHER | Age: 28
End: 2018-06-20
Payer: COMMERCIAL

## 2018-06-20 VITALS
DIASTOLIC BLOOD PRESSURE: 77 MMHG | SYSTOLIC BLOOD PRESSURE: 135 MMHG | WEIGHT: 209 LBS | HEART RATE: 61 BPM | HEIGHT: 74 IN | BODY MASS INDEX: 26.82 KG/M2 | OXYGEN SATURATION: 100 % | RESPIRATION RATE: 16 BRPM

## 2018-06-20 DIAGNOSIS — Z30.2 ENCOUNTER FOR VASECTOMY: Primary | ICD-10-CM

## 2018-06-20 PROCEDURE — 99000 SPECIMEN HANDLING OFFICE-LAB: CPT | Performed by: UROLOGY

## 2018-06-20 PROCEDURE — 88302 TISSUE EXAM BY PATHOLOGIST: CPT | Performed by: UROLOGY

## 2018-06-20 PROCEDURE — 55250 REMOVAL OF SPERM DUCT(S): CPT | Performed by: UROLOGY

## 2018-06-20 NOTE — PATIENT INSTRUCTIONS
Post Vasectomy Instructions      Rest for the remainder of the day    Ice your testicles/scrotal area 15 minutes on/off throughout the rest of the day. This will decrease/prevent swelling of the scrotum.    Wear snug underwear for a few days to support your testicles.    Wait until late in the day tomorrow to shower, so that your incisions have a chance to knit. Do not bathe or go in a pool until the sutures have fallen off--usually 2 to 4 weeks    You may resume intercourse when you are comfortable and able to tolerate     You may resume normal LIGHT activity the next day or two. Do not complete strenuous activity for 5-7 days.    Please seek medical attention if you develop a fever greater than 100 degrees, as this may be sign of infection. Seek medical attention if you have excessive swelling, although keep in mind that some swelling is normal. Use Tylenol as needed for pain/discomfort on the day of the vasectomy. You may switch to Ibuprofen the following day if you continue to have discomfort.     Please use protection during the next three months, or until you have completed your semen analyses and been told that you are sterile    Please ejaculate 30-40 times prior to your first semen analysis to evacuate the live sperm from your vas deferens    Follow semen collection instructions below      If you have questions, please call [phone]. If it is urgent, please seek medical attention and do not leave a voicemail, as it may not be received until the following business day.      Semen Collection Instructions  You must consider yourself fertile after your vasectomy until sperm counts establish the fact that you are sterile. Abstain from ejaculation 2-7 days prior to collection      Obtain a clean, sterile specimen container for collection of the sperm specimen. This is included in the bag that was given to you at your appt. If you have lost these containers, you may pick them up at a [place] [facility].     Prior to  collection wash your hands and clean the head of your penis. Collect the semen sample by masturbation, and ejaculate into the sterile specimen container. DO NOT USE lubricants during masturbation. Your partner may help with this process, but only by masturbation.    Label the outside of the container with your name, date of birth, and date/time of collection.    Please call the [place] ([phone]) or [place] ([phone]) lab to schedule your appointment when you know you are bringing the specimen    When transporting the sample to the laboratory, keep the specimen cup warmed to body temperature. This can be accomplished by keeping the container in contact with your skin.    Deliver the specimen to the lab within ONE HOUR of collection. The clinic will notify you of your test results.    It is essential that you continue to use some method of birth control after your vasectomy until your doctor tells you that you are sterile

## 2018-06-20 NOTE — MR AVS SNAPSHOT
After Visit Summary   6/20/2018    Efren Handley    MRN: [id number]           Patient Information     Date Of Birth          1990        Visit Information        Provider Department      6/20/2018 10:00 AM Lloyd Quigley MD [facility]        Today's Diagnoses     Encounter for vasectomy    -  1      Care Instructions    Post Vasectomy Instructions      Rest for the remainder of the day    Ice your testicles/scrotal area 15 minutes on/off throughout the rest of the day. This will decrease/prevent swelling of the scrotum.    Wear snug underwear for a few days to support your testicles.    Wait until late in the day tomorrow to shower, so that your incisions have a chance to knit. Do not bathe or go in a pool until the sutures have fallen off--usually 2 to 4 weeks    You may resume intercourse when you are comfortable and able to tolerate     You may resume normal LIGHT activity the next day or two. Do not complete strenuous activity for 5-7 days.    Please seek medical attention if you develop a fever greater than 100 degrees, as this may be sign of infection. Seek medical attention if you have excessive swelling, although keep in mind that some swelling is normal. Use Tylenol as needed for pain/discomfort on the day of the vasectomy. You may switch to Ibuprofen the following day if you continue to have discomfort.     Please use protection during the next three months, or until you have completed your semen analyses and been told that you are sterile    Please ejaculate 30-40 times prior to your first semen analysis to evacuate the live sperm from your vas deferens    Follow semen collection instructions below      If you have questions, please call [phone]. If it is urgent, please seek medical attention and do not leave a voicemail, as it may not be received until the following business day.      Semen Collection Instructions  You must consider yourself fertile after your  vasectomy until sperm counts establish the fact that you are sterile. Abstain from ejaculation 2-7 days prior to collection      Obtain a clean, sterile specimen container for collection of the sperm specimen. This is included in the bag that was given to you at your appt. If you have lost these containers, you may pick them up at a [place] [facility].     Prior to collection wash your hands and clean the head of your penis. Collect the semen sample by masturbation, and ejaculate into the sterile specimen container. DO NOT USE lubricants during masturbation. Your partner may help with this process, but only by masturbation.    Label the outside of the container with your name, date of birth, and date/time of collection.    Please call the [place] ([phone]) or [place] ([phone]) lab to schedule your appointment when you know you are bringing the specimen    When transporting the sample to the laboratory, keep the specimen cup warmed to body temperature. This can be accomplished by keeping the container in contact with your skin.    Deliver the specimen to the lab within ONE HOUR of collection. The clinic will notify you of your test results.    It is essential that you continue to use some method of birth control after your vasectomy until your doctor tells you that you are sterile              Follow-ups after your visit        Future tests that were ordered for you today     Open Future Orders        Priority Expected Expires Ordered    Semen Analysis Post Vasectomy Routine  6/20/2019 6/20/2018            Who to contact     If you have questions or need follow up information about today's clinic visit or your schedule please contact [facility] directly at [phone].  Normal or non-critical lab and imaging results will be communicated to you by MyChart, letter or phone within 4 business days after the clinic has received the results. If you do not hear from us within 7 days, please  "contact the clinic through [employer] or phone. If you have a critical or abnormal lab result, we will notify you by phone as soon as possible.  Submit refill requests through [employer] or call your pharmacy and they will forward the refill request to us. Please allow 3 business days for your refill to be completed.          Additional Information About Your Visit        [facility] Information     [employer] gives you secure access to your electronic health record. If you see a primary care provider, you can also send messages to your care team and make appointments. If you have questions, please call your primary care clinic.  If you do not have a primary care provider, please call [phone] and they will assist you.        Care EveryWhere ID     This is your Care EveryWhere ID. This could be used by other organizations to access your [facility] medical records  [id number]        Your Vitals Were     Pulse Respirations Height Pulse Oximetry BMI (Body Mass Index)       61 16 1.88 m (6' 2\") 100% 26.83 kg/m2        Blood Pressure from Last 3 Encounters:   06/20/18 135/77   02/14/18 118/70   01/31/18 120/60    Weight from Last 3 Encounters:   06/20/18 94.8 kg (209 lb)   02/14/18 94.8 kg (209 lb)   01/31/18 95.9 kg (211 lb 8 oz)              We Performed the Following     CL SPECIMEN HANDLING,DR OFF->LAB     Surgical pathology exam     VASECTOMY UNILAT/BILAT W POSTOP SEMEN        Primary Care Provider Office Phone # Fax #    [employer] [facility] [phone]       [address] [place] [id number]        Equal Access to Services     [facility] Freeman Memorial HospitalDAVIDA : Hadii aad ku hadasho Soomaali, waaxda luqadaha, qaybta kaalmada kwasi, megan mcgregor . So [facility] [phone].    ATENCIÓN: Si habla español, tiene a white disposición servicios gratuitos de asistencia lingüística. Llame al [phone].    We comply with applicable federal civil rights laws and [place] laws. We do not discriminate on " the basis of race, color, national origin, age, disability, sex, sexual orientation, or gender identity.            Thank you!     Thank you for choosing [facility]  for your care. Our goal is always to provide you with excellent care. Hearing back from our patients is one way we can continue to improve our services. Please take a few minutes to complete the written survey that you may receive in the mail after your visit with us. Thank you!             Your Updated Medication List - Protect others around you: Learn how to safely use, store and throw away your medicines at www.disposemymeds.org.          This list is accurate as of 6/20/18 10:38 AM.  Always use your most recent med list.                   Brand Name Dispense Instructions for use Diagnosis    NO ACTIVE MEDICATIONS     0    .

## 2018-06-26 LAB — COPATH REPORT: NORMAL

## 2019-05-22 ENCOUNTER — OFFICE VISIT (OUTPATIENT)
Dept: FAMILY MEDICINE | Facility: OTHER | Age: 29
End: 2019-05-22
Payer: COMMERCIAL

## 2019-05-22 VITALS
OXYGEN SATURATION: 98 % | DIASTOLIC BLOOD PRESSURE: 84 MMHG | BODY MASS INDEX: 26.98 KG/M2 | HEART RATE: 84 BPM | WEIGHT: 203.6 LBS | SYSTOLIC BLOOD PRESSURE: 124 MMHG | TEMPERATURE: 97.9 F | HEIGHT: 73 IN | RESPIRATION RATE: 14 BRPM

## 2019-05-22 DIAGNOSIS — J20.9 ACUTE BRONCHITIS, UNSPECIFIED ORGANISM: Primary | ICD-10-CM

## 2019-05-22 PROCEDURE — 99214 OFFICE O/P EST MOD 30 MIN: CPT | Performed by: PHYSICIAN ASSISTANT

## 2019-05-22 RX ORDER — CODEINE PHOSPHATE AND GUAIFENESIN 10; 100 MG/5ML; MG/5ML
1-2 SOLUTION ORAL AT BEDTIME
Qty: 50 ML | Refills: 0 | Status: SHIPPED | OUTPATIENT
Start: 2019-05-22 | End: 2019-09-26

## 2019-05-22 RX ORDER — BENZONATATE 100 MG/1
100 CAPSULE ORAL 3 TIMES DAILY PRN
Qty: 30 CAPSULE | Refills: 0 | Status: SHIPPED | OUTPATIENT
Start: 2019-05-22 | End: 2019-09-26

## 2019-05-22 RX ORDER — ALBUTEROL SULFATE 90 UG/1
2 AEROSOL, METERED RESPIRATORY (INHALATION) EVERY 4 HOURS PRN
Qty: 8.5 G | Refills: 0 | Status: SHIPPED | OUTPATIENT
Start: 2019-05-22

## 2019-05-22 ASSESSMENT — MIFFLIN-ST. JEOR: SCORE: 1938.52

## 2019-05-22 NOTE — PROGRESS NOTES
Subjective     Efren Handley is a 29 year old male who presents to clinic today for the following health issues:    HPI   Acute Illness   Acute illness concerns: coughing   Onset: Sunday, three days     Fever: no    Chills/Sweats: no    Headache (location?): YES- from coughing     Sinus Pressure:no    Conjunctivitis:  no    Ear Pain: no    Rhinorrhea: YES    Congestion: YES    Sore Throat: YES- from coughing      Cough: YES-productive of green sputum, with shortness of breath    Wheeze: YES    Decreased Appetite: no    Nausea: no    Vomiting: no    Diarrhea:  no    Dysuria/Freq.: no    Fatigue/Achiness: no    Sick/Strep Exposure: no     Therapies Tried and outcome: none    Patient Active Problem List   Diagnosis     CARDIOVASCULAR SCREENING; LDL GOAL LESS THAN 160     Past Surgical History:   Procedure Laterality Date     NO HISTORY OF SURGERY         Social History     Tobacco Use     Smoking status: Current Some Day Smoker     Smokeless tobacco: Never Used     Tobacco comment: occasional   Substance Use Topics     Alcohol use: Yes     Comment: occ     History reviewed. No pertinent family history.      Current Outpatient Medications   Medication Sig Dispense Refill     albuterol (PROAIR HFA/PROVENTIL HFA/VENTOLIN HFA) 108 (90 Base) MCG/ACT inhaler Inhale 2 puffs into the lungs every 4 hours as needed for shortness of breath / dyspnea or wheezing 8.5 g 0     benzonatate (TESSALON) 100 MG capsule Take 1 capsule (100 mg) by mouth 3 times daily as needed for cough 30 capsule 0     guaiFENesin-codeine (ROBITUSSIN AC) 100-10 MG/5ML solution Take 5-10 mLs by mouth At Bedtime 50 mL 0     NO ACTIVE MEDICATIONS . 0 0       Reviewed and updated as needed this visit by Provider  Tobacco  Allergies  Meds  Problems  Med Hx  Surg Hx  Fam Hx         Review of Systems   ROS COMP: Constitutional, HEENT, cardiovascular, pulmonary, gi and gu systems are negative, except as otherwise noted.      Objective    /84   Pulse  "84   Temp 97.9  F (36.6  C) (Temporal)   Resp 14   Ht 1.864 m (6' 1.39\")   Wt 92.4 kg (203 lb 9.6 oz)   SpO2 98%   BMI 26.58 kg/m    Body mass index is 26.58 kg/m .  Physical Exam   GENERAL: healthy, alert and no distress  EYES: Eyes grossly normal to inspection, PERRL and conjunctivae and sclerae normal  HENT: ear canals and TM's normal, nose and mouth without ulcers or lesions  NECK: no adenopathy, no asymmetry, masses, or scars and thyroid normal to palpation  RESP: lungs clear to auscultation - no rales, rhonchi or wheezes  CV: regular rate and rhythm, normal S1 S2, no S3 or S4, no murmur, click or rub, no peripheral edema and peripheral pulses strong  MS: no gross musculoskeletal defects noted, no edema    Diagnostic Test Results:  Labs reviewed in Epic        Assessment & Plan       ICD-10-CM    1. Acute bronchitis, unspecified organism J20.9 albuterol (PROAIR HFA/PROVENTIL HFA/VENTOLIN HFA) 108 (90 Base) MCG/ACT inhaler     benzonatate (TESSALON) 100 MG capsule     guaiFENesin-codeine (ROBITUSSIN AC) 100-10 MG/5ML solution       Likely viral bronchitis, he has significant cough with inhalation but no wheezing or crackles on exam.  Discussed OTC therapies to help his symptoms including decongestants and expectorants.  Educated patient on use of the above listed medications including proper utilization of the albuterol.  Discussed side effects.  Use the Cheratussin at night only, no driving on medication.  Can use the Tessalon during the day as needed.  Stay hydrated.     Return in about 5 days (around 5/27/2019) for If not improving, sooner if worse or new concerns.     Options for treatment and follow-up care were reviewed with the patient and/or guardian. Patient and/or guardian engaged in the decision making process and verbalized understanding of the options discussed and agreed with the final plan.     Jose Eduardo Frederick PA-C  [facility]    "

## 2019-09-26 ENCOUNTER — OFFICE VISIT (OUTPATIENT)
Dept: FAMILY MEDICINE | Facility: CLINIC | Age: 29
End: 2019-09-26

## 2019-09-26 VITALS
HEART RATE: 86 BPM | WEIGHT: 199 LBS | RESPIRATION RATE: 18 BRPM | OXYGEN SATURATION: 97 % | SYSTOLIC BLOOD PRESSURE: 117 MMHG | BODY MASS INDEX: 25.98 KG/M2 | TEMPERATURE: 99.7 F | DIASTOLIC BLOOD PRESSURE: 78 MMHG

## 2019-09-26 DIAGNOSIS — Z72.0 TOBACCO ABUSE DISORDER: ICD-10-CM

## 2019-09-26 DIAGNOSIS — J20.9 ACUTE BRONCHITIS, UNSPECIFIED ORGANISM: Primary | ICD-10-CM

## 2019-09-26 PROCEDURE — 99213 OFFICE O/P EST LOW 20 MIN: CPT | Performed by: FAMILY MEDICINE

## 2019-09-26 RX ORDER — AZITHROMYCIN 250 MG/1
TABLET, FILM COATED ORAL
Qty: 6 TABLET | Refills: 0 | Status: SHIPPED | OUTPATIENT
Start: 2019-09-26 | End: 2020-02-14

## 2019-09-26 ASSESSMENT — PAIN SCALES - GENERAL: PAINLEVEL: NO PAIN (0)

## 2019-09-26 NOTE — NURSING NOTE
"Chief Complaint   Patient presents with     Cough     green sputum cough burning in chest fever x 2 days       Initial /78   Pulse 86   Temp 99.7  F (37.6  C) (Oral)   Resp 18   Wt 90.3 kg (199 lb)   SpO2 97%   BMI 25.98 kg/m   Estimated body mass index is 25.98 kg/m  as calculated from the following:    Height as of 5/22/19: 1.864 m (6' 1.39\").    Weight as of this encounter: 90.3 kg (199 lb).  Medication Reconciliation: complete  Jil Morris M.A.    "

## 2019-09-26 NOTE — PROGRESS NOTES
Subjective     Efren Handley is a 29 year old male who presents to clinic today for the following health issues:    HPI   RESPIRATORY SYMPTOMS      Duration: 2 days    Description  nasal congestion, rhinorrhea, sore throat, facial pain/pressure, cough, wheezing, fever, chills, fatigue/malaise, hoarse voice, myalgias, conjunctival irritation, nausea and stomach ache    Severity: moderate    Accompanying signs and symptoms: None    History (predisposing factors):  none    Precipitating or alleviating factors: None    Therapies tried and outcome:  rest and fluids guaifenesin        Had bronchitis in spring as well  Is coughing up lots of green mucus  He is a smoker and just quit 2 days ago    Reviewed and updated as needed this visit by Provider         Review of Systems   ROS COMP: Constitutional, HEENT, cardiovascular, pulmonary, gi and gu systems are negative, except as otherwise noted.      Objective    /78   Pulse 86   Temp 99.7  F (37.6  C) (Oral)   Resp 18   Wt 90.3 kg (199 lb)   SpO2 97%   BMI 25.98 kg/m    Body mass index is 25.98 kg/m .  Physical Exam   GENERAL: healthy, alert and no distress  RESP: lungs clear to auscultation - no rales, rhonchi or wheezes and rhonchi throughout  CV: regular rate and rhythm, normal S1 S2, no S3 or S4, no murmur, click or rub, no peripheral edema and peripheral pulses strong    Diagnostic Test Results:  Labs reviewed in Epic        Assessment & Plan     1. Acute bronchitis, unspecified organism  Given smoker, more likely to be bacterial. Will treat with abx. Follow-up if not improving  - azithromycin (ZITHROMAX) 250 MG tablet; Take 2 tablets (500 mg) by mouth daily for 1 day, THEN 1 tablet (250 mg) daily for 4 days.  Dispense: 6 tablet; Refill: 0    2. Tobacco abuse disorder  Encouraged to continue cessation       Tobacco Cessation:   reports that he has been smoking. He has never used smokeless tobacco.  Tobacco Cessation Action Plan: Information offered: Patient  "not interested at this time      BMI:   Estimated body mass index is 25.98 kg/m  as calculated from the following:    Height as of 5/22/19: 1.864 m (6' 1.39\").    Weight as of this encounter: 90.3 kg (199 lb).               No follow-ups on file.    Hanna Cheney MD  [facility]    "

## 2020-02-14 ENCOUNTER — OFFICE VISIT (OUTPATIENT)
Dept: FAMILY MEDICINE | Facility: OTHER | Age: 30
End: 2020-02-14
Payer: COMMERCIAL

## 2020-02-14 VITALS
HEART RATE: 68 BPM | DIASTOLIC BLOOD PRESSURE: 80 MMHG | RESPIRATION RATE: 16 BRPM | BODY MASS INDEX: 26.11 KG/M2 | TEMPERATURE: 98.4 F | WEIGHT: 200 LBS | OXYGEN SATURATION: 100 % | SYSTOLIC BLOOD PRESSURE: 126 MMHG

## 2020-02-14 DIAGNOSIS — F32.1 CURRENT MODERATE EPISODE OF MAJOR DEPRESSIVE DISORDER, UNSPECIFIED WHETHER RECURRENT (H): Primary | ICD-10-CM

## 2020-02-14 DIAGNOSIS — F10.20 ALCOHOL USE DISORDER, SEVERE, DEPENDENCE (H): ICD-10-CM

## 2020-02-14 DIAGNOSIS — G47.00 INSOMNIA, UNSPECIFIED TYPE: ICD-10-CM

## 2020-02-14 PROCEDURE — 99214 OFFICE O/P EST MOD 30 MIN: CPT | Performed by: NURSE PRACTITIONER

## 2020-02-14 RX ORDER — ESCITALOPRAM OXALATE 10 MG/1
10 TABLET ORAL DAILY
Qty: 30 TABLET | Refills: 0 | Status: SHIPPED | OUTPATIENT
Start: 2020-02-14 | End: 2020-03-19

## 2020-02-14 RX ORDER — FOLIC ACID 1 MG/1
1 TABLET ORAL
COMMUNITY
Start: 2020-02-12

## 2020-02-14 RX ORDER — TRAZODONE HYDROCHLORIDE 50 MG/1
50-100 TABLET, FILM COATED ORAL AT BEDTIME
Qty: 60 TABLET | Refills: 0 | Status: SHIPPED | OUTPATIENT
Start: 2020-02-14 | End: 2020-03-19

## 2020-02-14 RX ORDER — ESCITALOPRAM OXALATE 10 MG/1
10 TABLET ORAL
COMMUNITY
Start: 2020-02-12 | End: 2020-02-14

## 2020-02-14 RX ORDER — TRAZODONE HYDROCHLORIDE 50 MG/1
50 TABLET, FILM COATED ORAL
COMMUNITY
Start: 2020-02-12 | End: 2020-02-14

## 2020-02-14 ASSESSMENT — ANXIETY QUESTIONNAIRES
3. WORRYING TOO MUCH ABOUT DIFFERENT THINGS: SEVERAL DAYS
2. NOT BEING ABLE TO STOP OR CONTROL WORRYING: SEVERAL DAYS
GAD7 TOTAL SCORE: 6
GAD7 TOTAL SCORE: 6
7. FEELING AFRAID AS IF SOMETHING AWFUL MIGHT HAPPEN: SEVERAL DAYS
6. BECOMING EASILY ANNOYED OR IRRITABLE: SEVERAL DAYS
1. FEELING NERVOUS, ANXIOUS, OR ON EDGE: SEVERAL DAYS
7. FEELING AFRAID AS IF SOMETHING AWFUL MIGHT HAPPEN: SEVERAL DAYS
4. TROUBLE RELAXING: NOT AT ALL
5. BEING SO RESTLESS THAT IT IS HARD TO SIT STILL: SEVERAL DAYS
GAD7 TOTAL SCORE: 6

## 2020-02-14 ASSESSMENT — PAIN SCALES - GENERAL: PAINLEVEL: NO PAIN (0)

## 2020-02-14 ASSESSMENT — PATIENT HEALTH QUESTIONNAIRE - PHQ9
10. IF YOU CHECKED OFF ANY PROBLEMS, HOW DIFFICULT HAVE THESE PROBLEMS MADE IT FOR YOU TO DO YOUR WORK, TAKE CARE OF THINGS AT HOME, OR GET ALONG WITH OTHER PEOPLE: SOMEWHAT DIFFICULT
SUM OF ALL RESPONSES TO PHQ QUESTIONS 1-9: 5
SUM OF ALL RESPONSES TO PHQ QUESTIONS 1-9: 5

## 2020-02-14 NOTE — PROGRESS NOTES
Subjective     Efren Handley is a 30 year old male who presents to clinic today for the following health issues:    History of Present Illness        Mental Health Follow-up:  Patient presents to follow-up on Depression & Anxiety.                 Social History  Tobacco Use    Smoking status: Current Some Day Smoker    Smokeless tobacco: Never Used    Tobacco comment: occasional  Alcohol use: Yes    Comment: occ  Drug use: No      Today's PHQ-9         PHQ-9 Total Score:     (P) 5   PHQ-9 Q9 Thoughts of better off dead/self-harm past 2 weeks :   (P) Not at all   Thoughts of suicide or self harm:      Self-harm Plan:        Self-harm Action:          Safety concerns for self or others:           He eats 0-1 servings of fruits and vegetables daily.He consumes 3 sweetened beverage(s) daily.He exercises with enough effort to increase his heart rate 30 to 60 minutes per day.  He exercises with enough effort to increase his heart rate 6 days per week.   He is taking medications regularly.    Mental Health Follow-up:  Patient presents to follow-up on Depression & Anxiety.     Social History  Tobacco Use    Smoking status: Current Some Day Smoker    Smokeless tobacco: Never Used    Tobacco comment: occasional  Alcohol use: Yes    Comment: occ  Drug use: No      Today's PHQ-9         PHQ-9 Total Score:     (P) 5   PHQ-9 Q9 Thoughts of better off dead/self-harm past 2 weeks :   (P) Not at all   Thoughts of suicide or self harm:      Self-harm Plan:        Self-harm Action:          Safety concerns for self or others:           Hospital Follow-up - [facility].,  On new meds and not missing any doses.  Feeling somewhat better.     Feeling better with medications, been on them for about a week now.       [facility]:  HISTORY OF PRESENTING PROBLEM  HPI at initial presentation per ER/A&R notes:   Describe the specific event that led to today's ED visit: pt was  "at his father's last night having beer and text wife stating that he was going to take his father's truck, come home and shoot himself in the garage.     Describe the patient's current behaviors and clinical presentation: pt presents in the ED with wife, parents, and sister. Pt is teary eyed and acknowledges he needs help.  Pt reports that he got a DUI a \"couple months\"ago and has been fighting with his wife since this time.  Pt reports that he is depressed and has had suicidal ideations over the last couple months, since DUI.  Pt notes he is typically a happier person and now he does not know what is going on in his head. Pt reports that he has guns at home with access to them. Pt reports that he has stated to his wife over the last couple months that he wants to shoot himself.       Pt reports that he and he attended therapy a couple times last month but did not think he was getting anything out of it so he stopped going.  Pt reports never feeling like this before and needs to figure out what is going on.  Pt denies HI, verbal or auditory hallucinations.  Pt reports drinking five days per week, approximately five beers at a time.          Current Outpatient Medications   Medication Sig Dispense Refill     escitalopram (LEXAPRO) 10 MG tablet 10 mg       folic acid (FOLVITE) 1 MG tablet 1 mg       traZODone (DESYREL) 50 MG tablet 50 mg       albuterol (PROAIR HFA/PROVENTIL HFA/VENTOLIN HFA) 108 (90 Base) MCG/ACT inhaler Inhale 2 puffs into the lungs every 4 hours as needed for shortness of breath / dyspnea or wheezing (Patient not taking: Reported on 9/26/2019) 8.5 g 0     NO ACTIVE MEDICATIONS . 0 0       Reviewed and updated as needed this visit by Provider         Review of Systems         Objective    /80   Pulse 68   Temp 98.4  F (36.9  C)   Resp 16   Wt 90.7 kg (200 lb)   SpO2 100%   BMI 26.11 kg/m    Body mass index is 26.11 kg/m .  Physical Exam  Constitutional:       Appearance: He is " well-developed.   Cardiovascular:      Rate and Rhythm: Regular rhythm.   Neurological:      General: No focal deficit present.      Mental Status: He is alert and oriented to person, place, and time.   Psychiatric:         Mood and Affect: Mood normal.         Behavior: Behavior normal.         Thought Content: Thought content normal.         Judgment: Judgment normal.            Diagnostic Test Results:  none         Assessment & Plan       ICD-10-CM    1. Current moderate episode of major depressive disorder, unspecified whether recurrent (H) F32.1 MENTAL HEALTH REFERRAL  - Adult; Psychiatry and Medication Management; Psychiatry; [facility]: [facility]ry Service [phone].  Medication management & future refills will be returned to [facility] PCP upon completion of evaluation; We ila...     escitalopram (LEXAPRO) 10 MG tablet   2. Alcohol use disorder, severe, dependence (H) F10.20 MENTAL HEALTH REFERRAL  - Adult; Psychiatry and Medication Management; Psychiatry; [facility]: [facility] [phone].  Medication management & future refills will be returned to [facility] PCP upon completion of evaluation; We ila...   3. Insomnia, unspecified type G47.00 traZODone (DESYREL) 50 MG tablet      Patient here in follow up of his hospitalization. See note above.   Notes that he feels the medication is helping although he has only been on it for about a week. He notes he was in a bad place when he was admitted to the hospital. He since has not had a drink and continues to be sober. He has plans to see counseling. He is not sure if he has a psychiatry appointment set up so I did put in a referral. He denies any thoughts of self harm or suicide ideation. He did as about increasing his medications, I discussed with him that I felt it may be too soon to do this as it does take about 4-6 weeks to build up. I would like to monitor him very closely so we did make an appointment in 4 weeks to recheck and  discuss medications. That is if he does not see psychiatry first. I do feel this appointment is also important as he should have an overall health check with his alcohol use.     He will also continue with the Trazodone nightly, I did increase this to 1-2 tablets as needed.     Continue to take medications  Follow up in 4 weeks, sooner if needed.     The patient indicates understanding of these issues and agrees with the plan.    Patient Instructions   -Follow up with me in 4 weeks  - Schedule Psychiatry and continue counseling.   - Please note that this medication can take at least one month to go into full effect, please do not be discouraged if you do not see your symptoms improving right away.  - It is important to also utilize other non pharmacological mechanisms to manage your anxiety, this includes regular exercise and healthy diet, support of friends and family, and undergoing individual psychotherapy  - Do not stop medication abruptly, please notify me if you have concerns before stopping as we will need to taper you off this medication  - Adherence is important with this medication.   - Follow up in 4 weeks.     - Continue Trazodone, can increase to 2 tablets if you feel this works better.     TALI Webb CNP            No follow-ups on file.    TALI Webb CNP  [facility]

## 2020-02-15 ASSESSMENT — PATIENT HEALTH QUESTIONNAIRE - PHQ9: SUM OF ALL RESPONSES TO PHQ QUESTIONS 1-9: 5

## 2020-02-15 ASSESSMENT — ANXIETY QUESTIONNAIRES: GAD7 TOTAL SCORE: 6

## 2020-03-06 NOTE — PROGRESS NOTES
Subjective     Efren Handley is a 30 year old male who presents to clinic today for the following health issues:    History of Present Illness        Mental Health Follow-up:  Patient presents to follow-up on Depression & Anxiety.Patient's depression since last visit has been:  Worse  The patient is having other symptoms associated with depression.  Patient's anxiety since last visit has been:  Worse  The patient is having other symptoms associated with anxiety.  Any significant life events: No  Patient is feeling anxious or having panic attacks.  Patient has no concerns about alcohol or drug use.     Social History  Tobacco Use    Smoking status: Current Some Day Smoker    Smokeless tobacco: Never Used    Tobacco comment: occasional  Alcohol use: Yes    Comment: occ  Drug use: No      Today's PHQ-9         PHQ-9 Total Score:     (P) 21   PHQ-9 Q9 Thoughts of better off dead/self-harm past 2 weeks :   (P) Not at all   Thoughts of suicide or self harm:      Self-harm Plan:        Self-harm Action:          Safety concerns for self or others:           He eats 0-1 servings of fruits and vegetables daily.He consumes 0 sweetened beverage(s) daily.He exercises with enough effort to increase his heart rate 30 to 60 minutes per day.  He exercises with enough effort to increase his heart rate 5 days per week.   He is taking medications regularly.       Social History     Tobacco Use     Smoking status: Current Some Day Smoker     Smokeless tobacco: Never Used     Tobacco comment: occasional   Substance Use Topics     Alcohol use: Yes     Comment: occ        Current Outpatient Medications   Medication Sig Dispense Refill     escitalopram (LEXAPRO) 10 MG tablet Take 1 tablet (10 mg) by mouth daily 30 tablet 0     folic acid (FOLVITE) 1 MG tablet 1 mg       traZODone (DESYREL) 50 MG tablet Take 1-2 tablets ( mg) by mouth At Bedtime 60 tablet 0     albuterol (PROAIR HFA/PROVENTIL HFA/VENTOLIN HFA) 108 (90 Base) MCG/ACT  inhaler Inhale 2 puffs into the lungs every 4 hours as needed for shortness of breath / dyspnea or wheezing (Patient not taking: Reported on 9/26/2019) 8.5 g 0     NO ACTIVE MEDICATIONS . 0 0       Reviewed and updated as needed this visit by Provider     Trazodone 2 tablets. Used to fall asleep in a half hour, now awake after taking it.     No thoughts of suicide or self harm  Feeling like the medicine is not helping.       Review of Systems         Objective    /78   Pulse 64   Temp 98  F (36.7  C)   Resp 16   Wt 90.3 kg (199 lb)   SpO2 98%   BMI 25.98 kg/m    Body mass index is 25.98 kg/m .  Physical Exam  Constitutional:       Appearance: He is well-developed.   Neurological:      Mental Status: He is alert and oriented to person, place, and time.   Psychiatric:         Mood and Affect: Mood normal.         Behavior: Behavior normal.         Thought Content: Thought content normal.         Judgment: Judgment normal.            Diagnostic Test Results:  none         Assessment & Plan       ICD-10-CM    1. Current moderate episode of major depressive disorder, unspecified whether recurrent (H)  F32.1 traZODone (DESYREL) 150 MG tablet     escitalopram (LEXAPRO) 10 MG tablet   2. Alcohol use disorder, severe, dependence (H)  F10.20      Patient here today for mood check. Continues to be sober and working on his mental health and relationships. He notes that initially the lexapro was helping him, he has now noticed it is starting to decrease in effectiveness. He notes that he does not have any suicidal thoughts or self harm. He reports that he is interested in increasing his dose. I feel this is reasonable today. He denies any side effects at his current dose. Recommend increasing his dose from 10 to 15mg for 1 week if tolerating ok then ok to do 20mg daily. Recommend recheck in 1 month with telephone visit. If stable then we can go further out with our OV. Given ED visit in the last year I recommend  close monitoring until stable.         The patient indicates understanding of these issues and agrees with the plan.    Patient Instructions   - Start Lexapro 1.5 tablets (15mg) daily for 1 week then if tolerating go to 2 tablets daily  - Increase Trazodone to 150mg daily.   - Follow up in 4 weeks.       TALI Webb CNP  Questions or concerns please feel free to send me a [employer] message or call me  Phone : [phone]          Return in about 4 weeks (around 4/10/2020) for Mood Check.    TALI Webb CNP  [facility]    Answers for HPI/ROS submitted by the patient on 3/13/2020   Chronic problems general questions HPI Form  If you checked off any problems, how difficult have these problems made it for you to do your work, take care of things at home, or get along with other people?: Very difficult  PHQ9 TOTAL SCORE: 21  ALEJANDRO 7 TOTAL SCORE: 20

## 2020-03-13 ENCOUNTER — OFFICE VISIT (OUTPATIENT)
Dept: FAMILY MEDICINE | Facility: OTHER | Age: 30
End: 2020-03-13
Payer: COMMERCIAL

## 2020-03-13 VITALS
RESPIRATION RATE: 16 BRPM | TEMPERATURE: 98 F | DIASTOLIC BLOOD PRESSURE: 78 MMHG | WEIGHT: 199 LBS | BODY MASS INDEX: 25.98 KG/M2 | OXYGEN SATURATION: 98 % | SYSTOLIC BLOOD PRESSURE: 118 MMHG | HEART RATE: 64 BPM

## 2020-03-13 DIAGNOSIS — F32.1 CURRENT MODERATE EPISODE OF MAJOR DEPRESSIVE DISORDER, UNSPECIFIED WHETHER RECURRENT (H): Primary | ICD-10-CM

## 2020-03-13 DIAGNOSIS — F10.20 ALCOHOL USE DISORDER, SEVERE, DEPENDENCE (H): ICD-10-CM

## 2020-03-13 PROCEDURE — 99214 OFFICE O/P EST MOD 30 MIN: CPT | Performed by: NURSE PRACTITIONER

## 2020-03-13 RX ORDER — TRAZODONE HYDROCHLORIDE 150 MG/1
150 TABLET ORAL AT BEDTIME
Qty: 30 TABLET | Refills: 0 | Status: SHIPPED | OUTPATIENT
Start: 2020-03-13 | End: 2020-04-01

## 2020-03-13 RX ORDER — ESCITALOPRAM OXALATE 10 MG/1
TABLET ORAL
Qty: 60 TABLET | Refills: 0 | Status: SHIPPED | OUTPATIENT
Start: 2020-03-13 | End: 2020-04-01

## 2020-03-13 ASSESSMENT — ANXIETY QUESTIONNAIRES
7. FEELING AFRAID AS IF SOMETHING AWFUL MIGHT HAPPEN: NEARLY EVERY DAY
GAD7 TOTAL SCORE: 20
7. FEELING AFRAID AS IF SOMETHING AWFUL MIGHT HAPPEN: NEARLY EVERY DAY
3. WORRYING TOO MUCH ABOUT DIFFERENT THINGS: NEARLY EVERY DAY
GAD7 TOTAL SCORE: 20
6. BECOMING EASILY ANNOYED OR IRRITABLE: NEARLY EVERY DAY
5. BEING SO RESTLESS THAT IT IS HARD TO SIT STILL: MORE THAN HALF THE DAYS
4. TROUBLE RELAXING: NEARLY EVERY DAY
GAD7 TOTAL SCORE: 20
1. FEELING NERVOUS, ANXIOUS, OR ON EDGE: NEARLY EVERY DAY
2. NOT BEING ABLE TO STOP OR CONTROL WORRYING: NEARLY EVERY DAY

## 2020-03-13 ASSESSMENT — PATIENT HEALTH QUESTIONNAIRE - PHQ9
SUM OF ALL RESPONSES TO PHQ QUESTIONS 1-9: 21
10. IF YOU CHECKED OFF ANY PROBLEMS, HOW DIFFICULT HAVE THESE PROBLEMS MADE IT FOR YOU TO DO YOUR WORK, TAKE CARE OF THINGS AT HOME, OR GET ALONG WITH OTHER PEOPLE: VERY DIFFICULT
SUM OF ALL RESPONSES TO PHQ QUESTIONS 1-9: 21

## 2020-03-13 ASSESSMENT — PAIN SCALES - GENERAL: PAINLEVEL: NO PAIN (0)

## 2020-03-13 NOTE — PATIENT INSTRUCTIONS
- Start Lexapro 1.5 tablets (15mg) daily for 1 week then if tolerating go to 2 tablets daily  - Increase Trazodone to 150mg daily.   - Follow up in 4 weeks.       TALI Webb CNP  Questions or concerns please feel free to send me a [employer] message or call me  Phone : [phone]

## 2020-03-14 ASSESSMENT — PATIENT HEALTH QUESTIONNAIRE - PHQ9: SUM OF ALL RESPONSES TO PHQ QUESTIONS 1-9: 21

## 2020-03-14 ASSESSMENT — ANXIETY QUESTIONNAIRES: GAD7 TOTAL SCORE: 20

## 2020-03-19 ENCOUNTER — VIRTUAL VISIT (OUTPATIENT)
Dept: FAMILY MEDICINE | Facility: OTHER | Age: 30
End: 2020-03-19
Payer: COMMERCIAL

## 2020-03-19 ENCOUNTER — TELEPHONE (OUTPATIENT)
Dept: FAMILY MEDICINE | Facility: OTHER | Age: 30
End: 2020-03-19

## 2020-03-19 DIAGNOSIS — F32.1 CURRENT MODERATE EPISODE OF MAJOR DEPRESSIVE DISORDER, UNSPECIFIED WHETHER RECURRENT (H): Primary | ICD-10-CM

## 2020-03-19 DIAGNOSIS — F41.9 ANXIETY: ICD-10-CM

## 2020-03-19 PROCEDURE — 99441 ZZC PHYSICIAN TELEPHONE EVALUATION 5-10 MIN: CPT | Performed by: PHYSICIAN ASSISTANT

## 2020-03-19 RX ORDER — HYDROXYZINE HYDROCHLORIDE 25 MG/1
25-50 TABLET, FILM COATED ORAL 3 TIMES DAILY PRN
Qty: 30 TABLET | Refills: 1 | Status: SHIPPED | OUTPATIENT
Start: 2020-03-19 | End: 2020-04-01

## 2020-03-19 ASSESSMENT — PATIENT HEALTH QUESTIONNAIRE - PHQ9
SUM OF ALL RESPONSES TO PHQ QUESTIONS 1-9: 23
5. POOR APPETITE OR OVEREATING: NEARLY EVERY DAY

## 2020-03-19 ASSESSMENT — ANXIETY QUESTIONNAIRES
5. BEING SO RESTLESS THAT IT IS HARD TO SIT STILL: MORE THAN HALF THE DAYS
7. FEELING AFRAID AS IF SOMETHING AWFUL MIGHT HAPPEN: NEARLY EVERY DAY
IF YOU CHECKED OFF ANY PROBLEMS ON THIS QUESTIONNAIRE, HOW DIFFICULT HAVE THESE PROBLEMS MADE IT FOR YOU TO DO YOUR WORK, TAKE CARE OF THINGS AT HOME, OR GET ALONG WITH OTHER PEOPLE: VERY DIFFICULT
GAD7 TOTAL SCORE: 20
1. FEELING NERVOUS, ANXIOUS, OR ON EDGE: NEARLY EVERY DAY
6. BECOMING EASILY ANNOYED OR IRRITABLE: NEARLY EVERY DAY
3. WORRYING TOO MUCH ABOUT DIFFERENT THINGS: NEARLY EVERY DAY
2. NOT BEING ABLE TO STOP OR CONTROL WORRYING: NEARLY EVERY DAY

## 2020-03-19 NOTE — TELEPHONE ENCOUNTER
Reason for call:  Medication   If this is a refill request, has the caller requested the refill from the pharmacy already? No  Will the patient be using a [facility]? No  Name of the pharmacy and phone number for the current request: [facility] in [facility]    Name of the medication requested:Lexapro    Other request: Patient would like to increase his dosage for the lexapro was seen on 3/13 to regarding this medication.    Phone number to reach patient:  Home number on file [phone] (home) or Work number on file:  [phone] (work)    Best Time:  Anytime     Can we leave a detailed message on this number?  YES    Travel screening: Not Applicable

## 2020-03-19 NOTE — TELEPHONE ENCOUNTER
KV states he needs to talk to an RN he is vulnerable- Call Mobile number  He has been texting suicidal thoughts- he cannot know we know this there is no C2C with person relaying information.   Transferred to TANMAY Diaz- needs to be triaged.   Kathya Ricks MA

## 2020-03-19 NOTE — PROGRESS NOTES
"Efren Handley is a 30 year old male who is being evaluated via a billable telephone visit.      The patient has been notified of following:     \"This telephone visit will be conducted via a call between you and your physician/provider. We have found that certain health care needs can be provided without the need for a physical exam.  This service lets us provide the care you need with a short phone conversation.  If a prescription is necessary we can send it directly to your pharmacy.  If lab work is needed we can place an order for that and you can then stop by our lab to have the test done at a later time.    If during the course of the call the physician/provider feels a telephone visit is not appropriate, you will not be charged for this service.\"     Efren Handley complains of    Chief Complaint   Patient presents with     Anxiety       I have reviewed and updated the patient's Past Medical History, Social History, Family History and Medication List.    ALLERGIES  No known drug allergies      Additional provider notes: He continues to experience a lot of severe depressive symptoms including decreased motivation, lack of energy and hopelessness but he denies any current thoughts of self harm/suicide. His mother had called in yesterday to notify the clinic that he had been sending some suicidal texts but patient is not aware she called. He was hospitalized for suicidal ideation in February but again states he has not current thoughts. He has been more anxious lately as he is worrying about multiple things throughout the day. He states the anxiety has been debilitating. He is going through a divorce which is making symptoms worse. He continues to work at [employer] in [employer] and also continues to exercise frequently as this is something he enjoys. His Lexapro was increased by Raeann Vasquez last week to 15 mg and then 20 mg. He states he went right to the 20 mg tablets and has not noticed any benefit so is wondering if " "there is anything else he can try.     Assessment/Plan:    ICD-10-CM    1. Current moderate episode of major depressive disorder, unspecified whether recurrent (H)  F32.1 hydrOXYzine (ATARAX) 25 MG tablet   2. Anxiety  F41.9 hydrOXYzine (ATARAX) 25 MG tablet       I discussed his current symptoms in detail and he reassured me that he has no current thought of self harm. I advised him to call 911 or a family/friend right away if he develops a serious plan for self harm/suicide and he voiced understanding.  I notified him that increased dosages of medications like Lexapro can take 4+ weeks to take full effect so he should give it more time and will continue the 20 mg dose.   I discussed adding hydroxyzine to help with his anxiety but he states, \"This has not worked for me in the past, I need something stronger.\" I notified him that I am not comfortable prescribing any benzos given his alcohol abuse history. He is willing to try hydroxyzine again and I recommend he take 1-2 tablets 3 times daily as needed. Instructed not to take before work or before driving as it can cause drowsiness.  He has started counseling at [facility] here in [place] and will call today to make a follow up.  Will plan on recheck via phone visit with Zandra Vasquez in 2 weeks since he is more established with her. He is in agreement with this plan.      Phone call duration:  9 minutes    Jaxon Diaz PA-C      "

## 2020-03-19 NOTE — TELEPHONE ENCOUNTER
Currently at 20mg would like to increase more wondering if there is something KV can just give him to make him okay- he needs more this divorce is really getting to him.     Called KV - will wait for her advice.    Kathya Ricks MA

## 2020-03-19 NOTE — TELEPHONE ENCOUNTER
How many Mg is he currently taking? On 03/13, KV prescribed 15 mg - was to do the 15 mg for 1 week and then increase to 20 mg. It has been 6 days since this was prescribed, so he wouldn't have even started the 20mg if following the sig?

## 2020-03-19 NOTE — TELEPHONE ENCOUNTER
He needs to be triaged regarding his current mental health symptoms. He does not know that his mother called in to let us know he was sending suicidal texts.    Jaxon Diaz PA-C

## 2020-03-19 NOTE — TELEPHONE ENCOUNTER
"Per last OV: \"Recommend increasing his dose from 10 to 15mg for 1 week if tolerating ok then ok to do 20mg daily.\"    Medication was sent on 03/13.    Called work number - when asking if Efren could verify his last name and birthday he said \"Why do you need that? What is this in regards to.\" I told him that I needed to verify that it was him before giving any information. He wouldn't provide his last name/birthday and said I had the wrong number.   Attempted to call mobile number - sounded like the same person on voicemail, but voicemail was full so unable to leave message.    If he calls back, please let him know that KV sent his medication to the pharmacy on 03/13 and it has the directions to increase the dose to 20mg.  Raeann Lopez, CMA      "

## 2020-03-19 NOTE — TELEPHONE ENCOUNTER
Called patient, recently increased his Lexapro, doesn't think it is working at all.  Has been taking Lexapro 20mg daily x 1 week  - 15mg daily for 2 days before that  - 10mg daily before that    Very irritable  'lots of anxiety and worrying'  Very easily becomes very angry, not like himself  Denies SI, thoughts of hurting himself or others  He has no tolerance for anyone, so he just stayed home from work today    Discussed how long it usually takes before mood meds begin to work, typically 2 weeks or more  Per JM, offered tele visit with JM to discuss mood today, he wants to do this.  Scheduled:  Next 5 appointments (look out 90 days)    Mar 19, 2020 11:15 AM CDT  Telephone Visit with Jaxon Diaz PA-C  [facility] ([facility] [address] [place] [phone]  [phone]   Apr 08, 2020  3:00 PM CDT  Telephone Visit with TALI [facility] ([facility]) [address] [place] [id number]  [phone]        Geraldine Smith, NEENAN, RN, PHN

## 2020-03-20 ASSESSMENT — ANXIETY QUESTIONNAIRES: GAD7 TOTAL SCORE: 20

## 2020-03-25 NOTE — PROGRESS NOTES
"Efren Handley is a 30 year old male who is being evaluated via a billable telephone visit.      The patient has been notified of following:     \"This telephone visit will be conducted via a call between you and your physician/provider. We have found that certain health care needs can be provided without the need for a physical exam.  This service lets us provide the care you need with a short phone conversation.  If a prescription is necessary we can send it directly to your pharmacy.  If lab work is needed we can place an order for that and you can then stop by our lab to have the test done at a later time.    If during the course of the call the physician/provider feels a telephone visit is not appropriate, you will not be charged for this service.\"     Efren Handley complains of  No chief complaint on file.      I have reviewed and updated the patient's Past Medical History, Social History, Family History and Medication List.    ALLERGIES  No known drug allergies    Depression and Anxiety Follow-Up    How are you doing with your depression since your last visit? No change    How are you doing with your anxiety since your last visit?  No change    Are you having other symptoms that might be associated with depression or anxiety? Yes:  panic attaacks     Have you had a significant life event? Relationship Concerns Divorce     Do you have any concerns with your use of alcohol or other drugs? No    Social History     Tobacco Use     Smoking status: Current Some Day Smoker     Smokeless tobacco: Never Used     Tobacco comment: occasional   Substance Use Topics     Alcohol use: Not Currently     Drug use: No     PHQ 3/13/2020 3/19/2020 4/1/2020   PHQ-9 Total Score 21 23 23   Q9: Thoughts of better off dead/self-harm past 2 weeks Not at all Not at all Not at all     ALEJANDRO-7 SCORE 3/13/2020 3/19/2020 4/1/2020   Total Score 20 (severe anxiety) - -   Total Score 20 20 21     Last PHQ-9 4/1/2020   1.  Little interest or " pleasure in doing things 3   2.  Feeling down, depressed, or hopeless 3   3.  Trouble falling or staying asleep, or sleeping too much 2   4.  Feeling tired or having little energy 3   5.  Poor appetite or overeating 3   6.  Feeling bad about yourself 3   7.  Trouble concentrating 3   8.  Moving slowly or restless 3   Q9: Thoughts of better off dead/self-harm past 2 weeks 0   PHQ-9 Total Score 23   Difficulty at work, home, or with people Extremely dIfficult     ALEJANDRO-7  4/1/2020   1. Feeling nervous, anxious, or on edge 3   2. Not being able to stop or control worrying 3   3. Worrying too much about different things 3   4. Trouble relaxing 3   5. Being so restless that it is hard to sit still 3   6. Becoming easily annoyed or irritable 3   7. Feeling afraid, as if something awful might happen 3   ALEJANDRO-7 Total Score 21   If you checked any problems, how difficult have they made it for you to do your work, take care of things at home, or get along with other people? Extremely difficult     In the past two weeks have you had thoughts of suicide or self-harm?  No.    Do you have concerns about your personal safety or the safety of others?   No    Suicide Assessment Five-step Evaluation and Treatment (SAFE-T)    Additional provider notes:     Patient recently increased his Lexapro from 10mg to 15mg then to 20mg from 3/13/20 to now.     Getting a Divorce. Feeling the same on 20mg.      Atarax- Did not work for panic feeling to last as long as he would like. Takes  2 when he gets home from work everyday and then 2 around 10AM on weekends and at night on the weekends.     Counseling, him and wife went to counseling. Has not gone back since him and his wife decided to get . Living at his parents now. Patient continues to be sober from alcohol. Has tried meditation, takes away the initial panic, then panic comes back.     Trazodone helps with sleep.         Assessment/Plan:  1. Alcohol use disorder, severe, dependence  (H)  - Continues to be sober.     2. Current moderate episode of major depressive disorder, unspecified whether recurrent (H)  - Unstable  - At this time recommend adding wellbutrin with the Lexapro. We discussed medication and side effects. He will Mychart me in 1-2 weeks to let me know how he is doing. We can increase the Wellbutrin in 1-2 weeks depending on how he is doing it.   - Advised him to continue with 20mg of Lexapro as data.   - Could consider Buspar if Wellbutrin does not help him.   - Continues Atarax as needed PRN.   - Living with his parents, going through divorce.   - Feels safe, no thoughts of suicide or self harm.   - buPROPion (WELLBUTRIN XL) 150 MG 24 hr tablet; Take 1 tablet (150 mg) by mouth every morning  Dispense: 30 tablet; Refill: 0  - escitalopram (LEXAPRO) 10 MG tablet; Take 2 tablets (20 mg) by mouth daily  Dispense: 60 tablet; Refill: 0  - hydrOXYzine (ATARAX) 25 MG tablet; Take 1-2 tablets (25-50 mg) by mouth 3 times daily as needed for anxiety  Dispense: 30 tablet; Refill: 1  - MENTAL HEALTH REFERRAL  - Adult; Outpatient Treatment; Individual/Couples/Family/Group Therapy/Health Psychology; [facility]: [facility] [phone]; We will contact you to schedule the appointment or please call with any questions  - traZODone (DESYREL) 150 MG tablet; Take 1 tablet (150 mg) by mouth At Bedtime  Dispense: 30 tablet; Refill: 1    3. ALEJANDRO (generalized anxiety disorder)  - Continues to have moments of panic  - Recommend continuing with above regimen, adding on Wellbutrin.   - Could consider Buspar in future instead of Wellbutrin if this makes anxiety worse or does not work for him.   -Asked for something stronger for his panic attacks, I discussed with him I did not feel benzodiazepines are appropriate at this time especially with an alcohol dependence history.   - buPROPion (WELLBUTRIN XL) 150 MG 24 hr tablet; Take 1 tablet (150 mg) by mouth every morning  Dispense: 30 tablet;  Refill: 0  - MENTAL HEALTH REFERRAL  - Adult; Outpatient Treatment; Individual/Couples/Family/Group Therapy/Health Psychology; [facility]: [facility] [phone]; We will contact you to schedule the appointment or please call with any questions        Phone call duration:  14 minutes    TALI Webb CNP

## 2020-04-01 ENCOUNTER — VIRTUAL VISIT (OUTPATIENT)
Dept: FAMILY MEDICINE | Facility: OTHER | Age: 30
End: 2020-04-01
Payer: COMMERCIAL

## 2020-04-01 DIAGNOSIS — F32.1 CURRENT MODERATE EPISODE OF MAJOR DEPRESSIVE DISORDER, UNSPECIFIED WHETHER RECURRENT (H): ICD-10-CM

## 2020-04-01 DIAGNOSIS — F41.9 ANXIETY: ICD-10-CM

## 2020-04-01 DIAGNOSIS — F10.20 ALCOHOL USE DISORDER, SEVERE, DEPENDENCE (H): Primary | ICD-10-CM

## 2020-04-01 DIAGNOSIS — F41.1 GAD (GENERALIZED ANXIETY DISORDER): ICD-10-CM

## 2020-04-01 PROCEDURE — 99214 OFFICE O/P EST MOD 30 MIN: CPT | Mod: TEL | Performed by: NURSE PRACTITIONER

## 2020-04-01 RX ORDER — BUPROPION HYDROCHLORIDE 150 MG/1
150 TABLET ORAL EVERY MORNING
Qty: 30 TABLET | Refills: 0 | Status: SHIPPED | OUTPATIENT
Start: 2020-04-01 | End: 2020-04-28

## 2020-04-01 RX ORDER — TRAZODONE HYDROCHLORIDE 150 MG/1
150 TABLET ORAL AT BEDTIME
Qty: 30 TABLET | Refills: 1 | Status: SHIPPED | OUTPATIENT
Start: 2020-04-01

## 2020-04-01 RX ORDER — ESCITALOPRAM OXALATE 10 MG/1
20 TABLET ORAL DAILY
Qty: 60 TABLET | Refills: 0 | Status: SHIPPED | OUTPATIENT
Start: 2020-04-01

## 2020-04-01 RX ORDER — HYDROXYZINE HYDROCHLORIDE 25 MG/1
25-50 TABLET, FILM COATED ORAL 3 TIMES DAILY PRN
Qty: 30 TABLET | Refills: 1 | Status: SHIPPED | OUTPATIENT
Start: 2020-04-01

## 2020-04-01 ASSESSMENT — ANXIETY QUESTIONNAIRES
2. NOT BEING ABLE TO STOP OR CONTROL WORRYING: NEARLY EVERY DAY
3. WORRYING TOO MUCH ABOUT DIFFERENT THINGS: NEARLY EVERY DAY
5. BEING SO RESTLESS THAT IT IS HARD TO SIT STILL: NEARLY EVERY DAY
7. FEELING AFRAID AS IF SOMETHING AWFUL MIGHT HAPPEN: NEARLY EVERY DAY
IF YOU CHECKED OFF ANY PROBLEMS ON THIS QUESTIONNAIRE, HOW DIFFICULT HAVE THESE PROBLEMS MADE IT FOR YOU TO DO YOUR WORK, TAKE CARE OF THINGS AT HOME, OR GET ALONG WITH OTHER PEOPLE: EXTREMELY DIFFICULT
1. FEELING NERVOUS, ANXIOUS, OR ON EDGE: NEARLY EVERY DAY
6. BECOMING EASILY ANNOYED OR IRRITABLE: NEARLY EVERY DAY
GAD7 TOTAL SCORE: 21

## 2020-04-01 ASSESSMENT — PATIENT HEALTH QUESTIONNAIRE - PHQ9
SUM OF ALL RESPONSES TO PHQ QUESTIONS 1-9: 23
5. POOR APPETITE OR OVEREATING: NEARLY EVERY DAY

## 2020-04-02 ASSESSMENT — ANXIETY QUESTIONNAIRES: GAD7 TOTAL SCORE: 21

## 2020-04-07 ENCOUNTER — TELEPHONE (OUTPATIENT)
Dept: FAMILY MEDICINE | Facility: OTHER | Age: 30
End: 2020-04-07

## 2020-04-07 NOTE — TELEPHONE ENCOUNTER
Patient on my schedule tomorrow for a mood. I just saw him last week, I think this was scheduled awhile back. Not certain he would like a visit this close to our adjustments. Please verify and schedule for 2:40 if he would like to talk tomorrow.       TALI Webb CNP  Questions or concerns please feel free to send me a [employer] message or call me  Phone : [phone]

## 2020-04-14 ENCOUNTER — TELEPHONE (OUTPATIENT)
Dept: FAMILY MEDICINE | Facility: OTHER | Age: 30
End: 2020-04-14

## 2020-04-28 ENCOUNTER — TELEPHONE (OUTPATIENT)
Dept: FAMILY MEDICINE | Facility: OTHER | Age: 30
End: 2020-04-28

## 2020-04-28 DIAGNOSIS — F41.1 GAD (GENERALIZED ANXIETY DISORDER): ICD-10-CM

## 2020-04-28 DIAGNOSIS — F32.1 CURRENT MODERATE EPISODE OF MAJOR DEPRESSIVE DISORDER, UNSPECIFIED WHETHER RECURRENT (H): ICD-10-CM

## 2020-04-28 RX ORDER — BUPROPION HYDROCHLORIDE 300 MG/1
300 TABLET ORAL EVERY MORNING
Qty: 30 TABLET | Refills: 0 | Status: SHIPPED | OUTPATIENT
Start: 2020-04-28

## 2020-04-28 NOTE — TELEPHONE ENCOUNTER
"Per 04/01 telephone visit, LUIS F wrote: \"2. Current moderate episode of major depressive disorder, unspecified whether recurrent (H)  - Unstable  - At this time recommend adding wellbutrin with the Lexapro. We discussed medication and side effects. He will Mychart me in 1-2 weeks to let me know how he is doing. We can increase the Wellbutrin in 1-2 weeks depending on how he is doing it. \"    Please review/advise in KV absence, if appropriate.  "

## 2020-04-28 NOTE — TELEPHONE ENCOUNTER
Reason for Call:  Medication or medication refill:    Do you use a [facility]?  Name of the pharmacy and phone number for the current request:  [facility]    Name of the medication requested: wellbutrin    Other request: patient wants a higher dose, he declined a phone visit, he said he was told he just needed to call.    Can we leave a detailed message on this number? NO    Phone number patient can be reached at: [phone]    Best Time:     Call taken on 4/28/2020 at 3:28 PM by Trinidad Be

## 2020-04-28 NOTE — TELEPHONE ENCOUNTER
Called and spoke with patient regarding message below.  Patient verbalized understanding.  Margaux Kirkland CMA ([facility])

## 2020-07-06 ENCOUNTER — PATIENT OUTREACH (OUTPATIENT)
Dept: CARE COORDINATION | Facility: CLINIC | Age: 30
End: 2020-07-06

## 2020-07-06 DIAGNOSIS — F10.929 ALCOHOLIC INTOXICATION WITH COMPLICATION (H): Primary | ICD-10-CM

## 2020-07-06 NOTE — PROGRESS NOTES
Clinic Care Coordination Contact  Community Health Worker Initial Outreach            Patient accepts CC: No, Patient declined services.. Patient will be sent Care Coordination introduction letter for future reference.     The Clinic Community Health Worker spoke with the patient today at the request of the PCP to discuss possible Clinic Care Coordination enrollment. The service was described to the patient and immediate needs were discussed. The patient declined enrollment at this time. The PCP is encouraged to refer in the future if the patient's needs change.      Marianela LATHAM Community Health Worker  Clinic Care Coordination  [facility] : Farideh Esposito & Genaro Goss  Phone: [phone]      Reason for Referral: Care Transition: ED to outpatient